# Patient Record
Sex: FEMALE | Race: WHITE | Employment: FULL TIME | URBAN - NONMETROPOLITAN AREA
[De-identification: names, ages, dates, MRNs, and addresses within clinical notes are randomized per-mention and may not be internally consistent; named-entity substitution may affect disease eponyms.]

---

## 2020-07-28 ENCOUNTER — OFFICE VISIT (OUTPATIENT)
Dept: ORTHOPEDIC SURGERY | Age: 45
End: 2020-07-28
Payer: COMMERCIAL

## 2020-07-28 VITALS — RESPIRATION RATE: 16 BRPM | BODY MASS INDEX: 35.31 KG/M2 | HEIGHT: 67 IN | WEIGHT: 225 LBS

## 2020-07-28 PROCEDURE — 99203 OFFICE O/P NEW LOW 30 MIN: CPT | Performed by: ORTHOPAEDIC SURGERY

## 2020-07-28 ASSESSMENT — ENCOUNTER SYMPTOMS
CHEST TIGHTNESS: 0
WHEEZING: 0
SHORTNESS OF BREATH: 0
EYE PAIN: 0
COLOR CHANGE: 0
VOMITING: 0
EYE REDNESS: 0

## 2020-07-28 NOTE — PATIENT INSTRUCTIONS
Continue current restrictions from 7/14/2020 through- until surgery(anticipate at least weeks of restrictions)  Right carpal tunnel release discussed   Will proceed with surgery   No meds list provided   Consent to be signed day of surgery   Obtain any clearances as needed, as discussed   Constanza, surgery scheduler will call you with date and time of surgery  Follow up for pre-op testing(COVID)/surgery as discussed     Call office with any questions ([address] [place] [address] surgery scheduler) [phone]

## 2020-07-28 NOTE — PROGRESS NOTES
2020   Chief Complaint   Patient presents with    Carpal Tunnel     right hand-index,middle and ring numbness and tingling         History of Present Illness:                             Brianne Bryan is a 38 y.o. female who presents today with right hand pain, numbness, and tingling. She has dealt with mild issues over the last 7 years with occasional numbness and tingling and pain in her radial 3 digits. She had an EMG performed in  which showed moderate carpal tunnel syndrome on the right upper extremity as well as a low-grade chronic right C7 radiculopathy. Initially she did not have any problems with her left hand but has noticed some mild symptoms over the last few months in her left hand with repetitive activities in a similar location of the radial 3 digits. Her pain is worse with repetitive activities especially while at work. She has a new job requires her to do heavy gripping and hammering activities which seem to aggravate her symptoms along the median nerve distribution. She has tried rest and anti-inflammatory medications and bracing which have seemed to help in the past but are no longer effective. She was placed on restrictions at work and is functioning well with her restrictions but they are set to . She is here today to discuss more definitive interventions such as surgical release of her carpal tunnel. Patient here for a new patient visit for evaluation of right hand CTS. She is having numbness and tingling in her index, middle and ring fingers along with pain and swelling in her hand and wrist. She is rigth hand dominant and states this is a chronic issue but has worsened over the last 7 months. She works at [employer] and has been on restrictions through the company due to her prior EMG but they are now saying her restrictions will end due to this being a pre-existing condition prior to hire. Her EMG was performed on 13.  She brings the EMG report with her today. She reports no history of surgeries or injuries to this right hand/wrist. She does have neck pain with a history of cervical radiculopathy. Medical History  Patient's medications, allergies, past medical, surgical, social and family histories were reviewed and updated as appropriate. Past Medical History:   Diagnosis Date    Depression     Insomnia      No family history on file. Social History     Socioeconomic History    Marital status:      Spouse name: None    Number of children: None    Years of education: None    Highest education level: None   Occupational History    None   Social Needs    Financial resource strain: None    Food insecurity     Worry: None     Inability: None    Transportation needs     Medical: None     Non-medical: None   Tobacco Use    Smoking status: Former Smoker    Smokeless tobacco: Never Used   Substance and Sexual Activity    Alcohol use: Yes     Comment: Occasionally    Drug use: No    Sexual activity: Never   Lifestyle    Physical activity     Days per week: None     Minutes per session: None    Stress: None   Relationships    Social connections     Talks on phone: None     Gets together: None     Attends [employer] service: None     Active member of club or organization: None     Attends meetings of clubs or organizations: None     Relationship status: None    Intimate partner violence     Fear of current or ex partner: None     Emotionally abused: None     Physically abused: None     Forced sexual activity: None   Other Topics Concern    None   Social History Narrative    None     Current Outpatient Medications   Medication Sig Dispense Refill    Multiple Vitamins-Minerals (MULTI COMPLETE PO) Take by mouth      Probiotic Product (PROBIOTIC-10 PO) Take by mouth      ibuprofen (ADVIL;MOTRIN) 800 MG tablet Take 800 mg by mouth every 6 hours as needed for Pain. No current facility-administered medications for this visit.       No Known Allergies    Review of Systems:   Review of Systems   Constitutional: Negative for chills and fever. HENT: Negative for congestion and sneezing. Eyes: Negative for pain and redness. Respiratory: Negative for chest tightness, shortness of breath and wheezing. Cardiovascular: Negative for chest pain and palpitations. Gastrointestinal: Negative for vomiting. Musculoskeletal: Positive for arthralgias and neck pain. Skin: Negative for color change and rash. Neurological: Positive for weakness and numbness. Psychiatric/Behavioral: Negative for agitation. The patient is not nervous/anxious. Examination:  General Exam:  Vitals: Resp 16   Ht 5' 7\" (1.702 m)   Wt 225 lb (102.1 kg)   BMI 35.24 kg/m²    Physical Exam  Vitals signs and nursing note reviewed. Constitutional:       Appearance: Normal appearance. HENT:      Head: Normocephalic and atraumatic. Eyes:      Conjunctiva/sclera: Conjunctivae normal.      Pupils: Pupils are equal, round, and reactive to light. Neck:      Musculoskeletal: Normal range of motion. Pulmonary:      Effort: Pulmonary effort is normal.   Musculoskeletal:      Right elbow: She exhibits normal range of motion, no swelling and no effusion. No tenderness found. Left wrist: Normal.      Right forearm: She exhibits no tenderness, no bony tenderness, no swelling and no edema. Left hand: She exhibits normal range of motion, no tenderness, no bony tenderness, normal two-point discrimination, normal capillary refill, no deformity and no swelling. Normal sensation noted. Normal strength noted. She exhibits no finger abduction, no thumb/finger opposition and no wrist extension trouble. Comments: Right Upper Extremity:    There is mild tenderness to palpation of the volar aspect of the wrist at the level of the carpal tunnel. There is decreased sensation to light touch in the median nerve distribution. Sensation is intact to light touch along the ulnar and radial nerves. Positive carpal compression test and Phalen's test.  There is mild relative weakness with 4+ strength out of 5 during  test, pinch test, and flexor pollicis brevis. Range of motion of the wrist and fingers is intact without limitation. Skin is intact without wounds or rash. 2+ radial pulse with brisk cap refill throughout the fingers. No atrophy of the thenar musculature. Strength 5/5 in finger and wrist flexion and extension. Skin:     General: Skin is warm and dry. Neurological:      Mental Status: She is alert and oriented to person, place, and time. Psychiatric:         Mood and Affect: Mood normal.         Behavior: Behavior normal.          Office Procedures:  No orders of the defined types were placed in this encounter. Assessment and Plan  1. Right carpal tunnel syndrome    The exam and history are consistent with carpal syndrome. We discussed treatment options for this. Conservative measures have not been successful, including activity modification, rest, over-the-counter anti-inflammatory medications, and night bracing. Due to the severity of symptoms I have recommended surgical intervention. This will consist of a carpal tunnel release. We discussed the potential for repeat EMG nerve conduction velocity testing to characterize the extent of the neurologic dysfunction. I do not feel that this is necessary given the straightforward presentation and exam at this time. We discussed the risks, benefits, and alternatives to surgery as well as the postoperative course. Risks include persistent pain, numbness, weakness, infection, stiffness, recurrence, and incomplete recovery of the nerve. The patient understands and would like to proceed with carpal tunnel release.     I have written her a note to continue with her current restrictions at work up until the time of surgery which I have estimated to be less than 6 weeks from now. We discussed return to work issues and I expect that she should be able to return to work without restrictions 6 weeks following surgery. The patient was counseled at length about the risks of ruth Covid-19 during their perioperative period and any recovery window from their procedure. The patient was made aware that ruth Covid-19  may worsen their prognosis for recovering from their procedure  and lend to a higher morbidity and/or mortality risk. All material risks, benefits, and reasonable alternatives including postponing the procedure were discussed. The patient does wish to proceed with the procedure at this time.         Electronically signed by Favio Sanchez MD on 7/28/2020 at 2:35 PM

## 2020-07-28 NOTE — LETTER
[facility]  [address] [address]  Phone: [phone]    Abiel Collado MD        July 28, 2020     Patient: Tj Thornton   YOB: 1975   Date of Visit: 7/28/2020       To Whom It May Concern: It is my medical opinion that [address] current restrictions from 7/14/2020 through- until surgery(anticipate at least weeks of restrictions). If you have any questions or concerns, please don't hesitate to call.     Sincerely,        Abiel Collado MD

## 2020-07-29 ENCOUNTER — TELEPHONE (OUTPATIENT)
Dept: ORTHOPEDIC SURGERY | Age: 45
End: 2020-07-29

## 2020-07-29 NOTE — TELEPHONE ENCOUNTER
Patient called stating that she would like to be on disability until surgery could be completed ASAP. She states that her work at this time does not have anything that would accommodate the restrictions that she had and want to put her on medical layoff and she would not have insurance for the surgery. At work she completes tasks with forceful gripping and uses powered hammer tools. Please advise.

## 2020-07-30 ENCOUNTER — TELEPHONE (OUTPATIENT)
Dept: ORTHOPEDIC SURGERY | Age: 45
End: 2020-07-30

## 2020-07-30 NOTE — TELEPHONE ENCOUNTER
Patient lorrie to work today and she states that they automatically put her on disability since she requested the disability forms.

## 2020-07-31 ENCOUNTER — ANESTHESIA EVENT (OUTPATIENT)
Dept: OPERATING ROOM | Age: 45
End: 2020-07-31
Payer: COMMERCIAL

## 2020-08-03 ENCOUNTER — HOSPITAL ENCOUNTER (OUTPATIENT)
Dept: PREADMISSION TESTING | Age: 45
Discharge: HOME | End: 2020-08-07
Payer: COMMERCIAL

## 2020-08-03 VITALS
DIASTOLIC BLOOD PRESSURE: 92 MMHG | SYSTOLIC BLOOD PRESSURE: 167 MMHG | BODY MASS INDEX: 35.75 KG/M2 | TEMPERATURE: 97 F | OXYGEN SATURATION: 98 % | HEART RATE: 107 BPM | RESPIRATION RATE: 18 BRPM | HEIGHT: 67 IN | WEIGHT: 227.8 LBS

## 2020-08-03 LAB
ANION GAP SERPL CALCULATED.3IONS-SCNC: 12 MMOL/L (ref 4–16)
BUN BLDV-MCNC: 10 MG/DL (ref 6–23)
CALCIUM SERPL-MCNC: 9.4 MG/DL (ref 8.3–10.6)
CHLORIDE BLD-SCNC: 93 MMOL/L (ref 99–110)
CO2: 27 MMOL/L (ref 21–32)
CREAT SERPL-MCNC: 0.6 MG/DL (ref 0.6–1.1)
GFR AFRICAN AMERICAN: >60 ML/MIN/1.73M2
GFR NON-AFRICAN AMERICAN: >60 ML/MIN/1.73M2
GLUCOSE BLD-MCNC: 326 MG/DL (ref 70–99)
HCT VFR BLD CALC: 47.4 % (ref 37–47)
HEMOGLOBIN: 15.6 GM/DL (ref 12.5–16)
MCH RBC QN AUTO: 32.8 PG (ref 27–31)
MCHC RBC AUTO-ENTMCNC: 32.9 % (ref 32–36)
MCV RBC AUTO: 99.8 FL (ref 78–100)
PDW BLD-RTO: 12.6 % (ref 11.7–14.9)
PLATELET # BLD: 189 K/CU MM (ref 140–440)
PMV BLD AUTO: 11.1 FL (ref 7.5–11.1)
POTASSIUM SERPL-SCNC: 4.3 MMOL/L (ref 3.5–5.1)
RBC # BLD: 4.75 M/CU MM (ref 4.2–5.4)
SODIUM BLD-SCNC: 132 MMOL/L (ref 135–145)
WBC # BLD: 6.9 K/CU MM (ref 4–10.5)

## 2020-08-03 PROCEDURE — 80048 BASIC METABOLIC PNL TOTAL CA: CPT

## 2020-08-03 PROCEDURE — 85027 COMPLETE CBC AUTOMATED: CPT

## 2020-08-03 PROCEDURE — 36415 COLL VENOUS BLD VENIPUNCTURE: CPT

## 2020-08-03 PROCEDURE — U0002 COVID-19 LAB TEST NON-CDC: HCPCS

## 2020-08-03 RX ORDER — CETIRIZINE HYDROCHLORIDE 10 MG/1
10 TABLET ORAL DAILY PRN
COMMUNITY

## 2020-08-03 ASSESSMENT — PAIN DESCRIPTION - PROGRESSION: CLINICAL_PROGRESSION: NOT CHANGED

## 2020-08-03 ASSESSMENT — PAIN DESCRIPTION - PAIN TYPE: TYPE: ACUTE PAIN

## 2020-08-03 ASSESSMENT — PAIN DESCRIPTION - ONSET: ONSET: ON-GOING

## 2020-08-03 ASSESSMENT — PAIN DESCRIPTION - ORIENTATION: ORIENTATION: RIGHT

## 2020-08-03 ASSESSMENT — PAIN DESCRIPTION - LOCATION: LOCATION: HAND

## 2020-08-03 ASSESSMENT — PAIN DESCRIPTION - FREQUENCY: FREQUENCY: CONTINUOUS

## 2020-08-03 ASSESSMENT — PAIN SCALES - GENERAL: PAINLEVEL_OUTOF10: 6

## 2020-08-03 ASSESSMENT — PAIN DESCRIPTION - DESCRIPTORS: DESCRIPTORS: ACHING;DULL

## 2020-08-03 NOTE — ANESTHESIA PRE PROCEDURE
Department of Anesthesiology  Preprocedure Note       Name:  Hernando Yarbrough   Age:  38 y.o.  :  1975                                          MRN:  [id number]         Date:  8/3/2020      Surgeon: Zack Perez):  Malcolm Luna MD    Procedure: Procedure(s):  RIGHT CARPAL TUNNEL RELEASE    Medications prior to admission:   Prior to Admission medications    Medication Sig Start Date End Date Taking? Authorizing Provider   cetirizine (ZYRTEC) 10 MG tablet Take 10 mg by mouth daily as needed for Allergies    Historical Provider, MD   Multiple Vitamins-Minerals (MULTI COMPLETE PO) Take by mouth    Historical Provider, MD   Probiotic Product (PROBIOTIC-10 PO) Take by mouth    Historical Provider, MD   ibuprofen (ADVIL;MOTRIN) 800 MG tablet Take 800 mg by mouth every 6 hours as needed for Pain. Historical Provider, MD       Current medications:    No current facility-administered medications for this encounter. Current Outpatient Medications   Medication Sig Dispense Refill    cetirizine (ZYRTEC) 10 MG tablet Take 10 mg by mouth daily as needed for Allergies      Multiple Vitamins-Minerals (MULTI COMPLETE PO) Take by mouth      Probiotic Product (PROBIOTIC-10 PO) Take by mouth      ibuprofen (ADVIL;MOTRIN) 800 MG tablet Take 800 mg by mouth every 6 hours as needed for Pain.          Allergies:  No Known Allergies    Problem List:    Patient Active Problem List   Diagnosis Code    Irregular menstrual cycle N92.6    Sprain and strain of ankle O22.748O, S96.919A       Past Medical History:        Diagnosis Date    Arthritis     Bilat knees    Depression     No meds as of 8/3/20    Insomnia     Seasonal allergies        Past Surgical History:        Procedure Laterality Date    DILATION AND CURETTAGE OF UTERUS  2018    ENDOMETRIAL ABLATION  2019    KNEE ARTHROSCOPY Right     TONSILLECTOMY AND ADENOIDECTOMY      TUBAL LIGATION  2018       Social History:    Social History     Tobacco Use  Smoking status: Former Smoker     Years: 1.00     Types: Cigarettes    Smokeless tobacco: Never Used    Tobacco comment: Smoked off and on for a year as a teenager   Substance Use Topics    Alcohol use: Yes     Comment: Rarely 1 drink every 3-6 months                                Counseling given: Not Answered  Comment: Smoked off and on for a year as a teenager      Vital Signs (Current): There were no vitals filed for this visit. BP Readings from Last 3 Encounters:   08/03/20 (!) 167/92   07/06/15 136/84   04/13/15 144/90       NPO Status:                                                                                 BMI:   Wt Readings from Last 3 Encounters:   08/03/20 227 lb 12.8 oz (103.3 kg)   07/28/20 225 lb (102.1 kg)   07/06/15 267 lb (121.1 kg)     There is no height or weight on file to calculate BMI.    CBC: No results found for: WBC, RBC, HGB, HCT, MCV, RDW, PLT    CMP: No results found for: NA, K, CL, CO2, BUN, CREATININE, GFRAA, AGRATIO, LABGLOM, GLUCOSE, PROT, CALCIUM, BILITOT, ALKPHOS, AST, ALT    POC Tests: No results for input(s): POCGLU, POCNA, POCK, POCCL, POCBUN, POCHEMO, POCHCT in the last 72 hours.     Coags: No results found for: PROTIME, INR, APTT    HCG (If Applicable):   Lab Results   Component Value Date    PREGTESTUR Neg 06/09/2014        ABGs: No results found for: PHART, PO2ART, WNS6KHI, URR6IDM, BEART, Q4OUFMBG     Type & Screen (If Applicable):  No results found for: LABABO, LABRH    Drug/Infectious Status (If Applicable):  No results found for: HIV, HEPCAB    COVID-19 Screening (If Applicable): No results found for: COVID19      Anesthesia Evaluation  Patient summary reviewed and Nursing notes reviewed  Airway: Mallampati: II  TM distance: <3 FB   Neck ROM: full  Mouth opening: < 3 FB and > = 3 FB Dental:          Pulmonary:Negative Pulmonary ROS                              Cardiovascular:  Exercise tolerance: good (>4 METS), (+) hypertension: mild,          Beta Blocker:  Not on Beta Blocker      ROS comment: Seeing pcp 8/4 for htn      Neuro/Psych:   (+) headaches: tension headaches, depression/anxiety              ROS comment: etoh rarely  GI/Hepatic/Renal:   (+) GERD: well controlled,           Endo/Other:    (+) : arthritis: OA., . Pt had no PAT visit       Abdominal:           Vascular:                                        Anesthesia Plan      MAC     ASA 2     (Covid pending )  Induction: intravenous. MIPS: Prophylactic antiemetics administered. Anesthetic plan and risks discussed with patient. Plan discussed with CRNA.                   ERNIE Sargent - DANIELLE   8/3/2020

## 2020-08-04 LAB
SARS-COV-2: NOT DETECTED
SOURCE: NORMAL

## 2020-08-06 ENCOUNTER — TELEPHONE (OUTPATIENT)
Dept: ORTHOPEDIC SURGERY | Age: 45
End: 2020-08-06

## 2020-08-07 NOTE — PROGRESS NOTES
Called and notified the patient that surg time moved to 0 with arrival at 1230- verbalized understanding

## 2020-08-10 ENCOUNTER — ANESTHESIA (OUTPATIENT)
Dept: OPERATING ROOM | Age: 45
End: 2020-08-10
Payer: COMMERCIAL

## 2020-08-10 ENCOUNTER — HOSPITAL ENCOUNTER (OUTPATIENT)
Age: 45
Setting detail: OUTPATIENT SURGERY
Discharge: HOME | End: 2020-08-10
Attending: ORTHOPAEDIC SURGERY | Admitting: ORTHOPAEDIC SURGERY
Payer: COMMERCIAL

## 2020-08-10 VITALS
BODY MASS INDEX: 35.63 KG/M2 | DIASTOLIC BLOOD PRESSURE: 78 MMHG | WEIGHT: 227 LBS | HEIGHT: 67 IN | RESPIRATION RATE: 16 BRPM | OXYGEN SATURATION: 97 % | TEMPERATURE: 98.7 F | HEART RATE: 109 BPM | SYSTOLIC BLOOD PRESSURE: 138 MMHG

## 2020-08-10 VITALS — SYSTOLIC BLOOD PRESSURE: 149 MMHG | OXYGEN SATURATION: 98 % | DIASTOLIC BLOOD PRESSURE: 78 MMHG

## 2020-08-10 PROCEDURE — 2709999900 HC NON-CHARGEABLE SUPPLY: Performed by: ORTHOPAEDIC SURGERY

## 2020-08-10 PROCEDURE — 7100000011 HC PHASE II RECOVERY - ADDTL 15 MIN: Performed by: ORTHOPAEDIC SURGERY

## 2020-08-10 PROCEDURE — 3700000001 HC ADD 15 MINUTES (ANESTHESIA): Performed by: ORTHOPAEDIC SURGERY

## 2020-08-10 PROCEDURE — 3700000000 HC ANESTHESIA ATTENDED CARE: Performed by: ORTHOPAEDIC SURGERY

## 2020-08-10 PROCEDURE — 2500000003 HC RX 250 WO HCPCS: Performed by: NURSE ANESTHETIST, CERTIFIED REGISTERED

## 2020-08-10 PROCEDURE — 2580000003 HC RX 258: Performed by: NURSE ANESTHETIST, CERTIFIED REGISTERED

## 2020-08-10 PROCEDURE — 7100000010 HC PHASE II RECOVERY - FIRST 15 MIN: Performed by: ORTHOPAEDIC SURGERY

## 2020-08-10 PROCEDURE — 6360000002 HC RX W HCPCS: Performed by: NURSE ANESTHETIST, CERTIFIED REGISTERED

## 2020-08-10 PROCEDURE — 64721 CARPAL TUNNEL SURGERY: CPT | Performed by: ORTHOPAEDIC SURGERY

## 2020-08-10 PROCEDURE — 2580000003 HC RX 258: Performed by: ANESTHESIOLOGY

## 2020-08-10 PROCEDURE — 3600000002 HC SURGERY LEVEL 2 BASE: Performed by: ORTHOPAEDIC SURGERY

## 2020-08-10 PROCEDURE — 3600000012 HC SURGERY LEVEL 2 ADDTL 15MIN: Performed by: ORTHOPAEDIC SURGERY

## 2020-08-10 PROCEDURE — 2500000003 HC RX 250 WO HCPCS: Performed by: ORTHOPAEDIC SURGERY

## 2020-08-10 RX ORDER — SODIUM CHLORIDE, SODIUM LACTATE, POTASSIUM CHLORIDE, CALCIUM CHLORIDE 600; 310; 30; 20 MG/100ML; MG/100ML; MG/100ML; MG/100ML
INJECTION, SOLUTION INTRAVENOUS CONTINUOUS PRN
Status: DISCONTINUED | OUTPATIENT
Start: 2020-08-10 | End: 2020-08-10 | Stop reason: SDUPTHER

## 2020-08-10 RX ORDER — FENTANYL CITRATE 50 UG/ML
INJECTION, SOLUTION INTRAMUSCULAR; INTRAVENOUS PRN
Status: DISCONTINUED | OUTPATIENT
Start: 2020-08-10 | End: 2020-08-10 | Stop reason: SDUPTHER

## 2020-08-10 RX ORDER — SODIUM CHLORIDE, SODIUM LACTATE, POTASSIUM CHLORIDE, CALCIUM CHLORIDE 600; 310; 30; 20 MG/100ML; MG/100ML; MG/100ML; MG/100ML
INJECTION, SOLUTION INTRAVENOUS ONCE
Status: COMPLETED | OUTPATIENT
Start: 2020-08-10 | End: 2020-08-10

## 2020-08-10 RX ORDER — HYDROCODONE BITARTRATE AND ACETAMINOPHEN 5; 325 MG/1; MG/1
1 TABLET ORAL EVERY 6 HOURS PRN
Qty: 12 TABLET | Refills: 0 | Status: SHIPPED | OUTPATIENT
Start: 2020-08-10 | End: 2020-08-13

## 2020-08-10 RX ORDER — CEFAZOLIN SODIUM 2 G/100ML
2 INJECTION, SOLUTION INTRAVENOUS
Status: DISCONTINUED | OUTPATIENT
Start: 2020-08-10 | End: 2020-08-10 | Stop reason: HOSPADM

## 2020-08-10 RX ORDER — FENTANYL CITRATE 50 UG/ML
25 INJECTION, SOLUTION INTRAMUSCULAR; INTRAVENOUS EVERY 5 MIN PRN
Status: DISCONTINUED | OUTPATIENT
Start: 2020-08-10 | End: 2020-08-10 | Stop reason: HOSPADM

## 2020-08-10 RX ORDER — FENTANYL CITRATE 50 UG/ML
50 INJECTION, SOLUTION INTRAMUSCULAR; INTRAVENOUS EVERY 5 MIN PRN
Status: DISCONTINUED | OUTPATIENT
Start: 2020-08-10 | End: 2020-08-10 | Stop reason: HOSPADM

## 2020-08-10 RX ORDER — KETAMINE HYDROCHLORIDE 10 MG/ML
INJECTION, SOLUTION INTRAMUSCULAR; INTRAVENOUS PRN
Status: DISCONTINUED | OUTPATIENT
Start: 2020-08-10 | End: 2020-08-10 | Stop reason: SDUPTHER

## 2020-08-10 RX ORDER — ONDANSETRON 2 MG/ML
4 INJECTION INTRAMUSCULAR; INTRAVENOUS
Status: DISCONTINUED | OUTPATIENT
Start: 2020-08-10 | End: 2020-08-10 | Stop reason: HOSPADM

## 2020-08-10 RX ORDER — PROPOFOL 10 MG/ML
INJECTION, EMULSION INTRAVENOUS PRN
Status: DISCONTINUED | OUTPATIENT
Start: 2020-08-10 | End: 2020-08-10 | Stop reason: SDUPTHER

## 2020-08-10 RX ORDER — LIDOCAINE HYDROCHLORIDE 20 MG/ML
INJECTION, SOLUTION INTRAVENOUS PRN
Status: DISCONTINUED | OUTPATIENT
Start: 2020-08-10 | End: 2020-08-10 | Stop reason: SDUPTHER

## 2020-08-10 RX ORDER — LABETALOL HYDROCHLORIDE 5 MG/ML
5 INJECTION, SOLUTION INTRAVENOUS EVERY 10 MIN PRN
Status: DISCONTINUED | OUTPATIENT
Start: 2020-08-10 | End: 2020-08-10 | Stop reason: HOSPADM

## 2020-08-10 RX ORDER — HYDRALAZINE HYDROCHLORIDE 20 MG/ML
5 INJECTION INTRAMUSCULAR; INTRAVENOUS EVERY 10 MIN PRN
Status: DISCONTINUED | OUTPATIENT
Start: 2020-08-10 | End: 2020-08-10 | Stop reason: HOSPADM

## 2020-08-10 RX ADMIN — FENTANYL CITRATE 25 MCG: 50 INJECTION INTRAMUSCULAR; INTRAVENOUS at 15:58

## 2020-08-10 RX ADMIN — SODIUM CHLORIDE, POTASSIUM CHLORIDE, SODIUM LACTATE AND CALCIUM CHLORIDE: 600; 310; 30; 20 INJECTION, SOLUTION INTRAVENOUS at 13:03

## 2020-08-10 RX ADMIN — PROPOFOL 580 MG: 10 INJECTION, EMULSION INTRAVENOUS at 15:50

## 2020-08-10 RX ADMIN — SODIUM CHLORIDE, POTASSIUM CHLORIDE, SODIUM LACTATE AND CALCIUM CHLORIDE: 600; 310; 30; 20 INJECTION, SOLUTION INTRAVENOUS at 15:46

## 2020-08-10 RX ADMIN — KETAMINE HYDROCHLORIDE 30 MG: 10 INJECTION INTRAMUSCULAR; INTRAVENOUS at 15:50

## 2020-08-10 RX ADMIN — LIDOCAINE HYDROCHLORIDE 100 MG: 20 INJECTION, SOLUTION INTRAVENOUS at 15:50

## 2020-08-10 RX ADMIN — FENTANYL CITRATE 75 MCG: 50 INJECTION INTRAMUSCULAR; INTRAVENOUS at 15:50

## 2020-08-10 ASSESSMENT — PULMONARY FUNCTION TESTS
PIF_VALUE: 0
PIF_VALUE: 1
PIF_VALUE: 0
PIF_VALUE: 1
PIF_VALUE: 0

## 2020-08-10 ASSESSMENT — PAIN DESCRIPTION - ORIENTATION: ORIENTATION: RIGHT

## 2020-08-10 ASSESSMENT — PAIN DESCRIPTION - FREQUENCY: FREQUENCY: INTERMITTENT

## 2020-08-10 ASSESSMENT — PAIN SCALES - GENERAL
PAINLEVEL_OUTOF10: 0

## 2020-08-10 ASSESSMENT — PAIN DESCRIPTION - PAIN TYPE: TYPE: SURGICAL PAIN

## 2020-08-10 NOTE — PROGRESS NOTES
Discharge instructions reviewed with patient and mom. Expressed understanding. Getting dressed for discharge.

## 2020-08-10 NOTE — OP NOTE
given.  The arm was  exsanguinated with an Esmarch and tourniquet inflated to 250 mmHg and  deflated at the conclusion of the case after less than 20 minutes of  tourniquet time. An incision was made overlying the carpal tunnel along the mid aspect  and dissection was carried down through subcutaneous tissues and palmar  fascia was identified and divided and retracted. This exposed the transverse  carpal ligament. The ligament was divided under direct visualization  with a 15-blade and the release was carried out distally to the level of  the palmar fat and then proximally into the distal forearm fascia. The  two ends of the transverse carpal ligament retracted nicely  revealing the healthy-appearing median nerve, which was protected  throughout the case. The nerve was visually inspected and found to be decompressed nicely at  the conclusion of the case. The wound was thoroughly irrigated and  tourniquet was deflated. Bleeding was controlled with a bipolar device as needed. Skin edges were re-approximated with interrupted 4-0 nylon sutures. Sterile dressing was applied with Xeroform, 4 x 8's, sterile Webril, and  an Ace wrap. The patient was awakened and taken to PACU in stable condition. All sponge and needle counts were correct. POSTOPERATIVE PLAN:  The patient will be discharged to home with instructions  for light use of the hand with range of motion exercises. Follow up in the office in 10 to 14 days for suture removal and we will  proceed with strengthening activities at that time.     Electronically signed by Althea Young MD on 8/10/2020 at 4:20 PM

## 2020-08-10 NOTE — ANESTHESIA POSTPROCEDURE EVALUATION
Department of Anesthesiology  Postprocedure Note    Patient: Carolann Welch  MRN: [id number]  YOB: 1975  Date of evaluation: 8/10/2020  Time:  4:23 PM     Procedure Summary     Date:  08/10/20 Room / Location:  [address]    Anesthesia Start:  7562 Anesthesia Stop:  2757    Procedure:  RIGHT CARPAL TUNNEL RELEASE (Right Wrist) Diagnosis:  (RIGHT CARPAL TUNNEL SYNDROME)    Surgeon:  Kaya Daniels MD Responsible Provider:  Annemarie Burch MD    Anesthesia Type:  MAC ASA Status:  2          Anesthesia Type: MAC    Dain Phase I:      Dain Phase II:      Last vitals: Reviewed and per EMR flowsheets.        Anesthesia Post Evaluation    Patient location during evaluation: bedside  Patient participation: complete - patient participated  Level of consciousness: awake and alert  Pain score: 0  Airway patency: patent  Nausea & Vomiting: no nausea and no vomiting  Complications: no  Cardiovascular status: hemodynamically stable  Respiratory status: acceptable  Hydration status: euvolemic

## 2020-08-10 NOTE — ANESTHESIA PRE PROCEDURE
Comment: Rarely 1 drink every 3-6 months                                Counseling given: Not Answered  Comment: Smoked off and on for a year as a teenager      Vital Signs (Current):   Vitals:    08/10/20 1243   BP: (!) 173/95   Pulse: 116   Resp: 16   Temp: 36.8 °C (98.2 °F)   TempSrc: Temporal   SpO2: 96%   Weight: 227 lb (103 kg)   Height: 5' 7\" (1.702 m)                                              BP Readings from Last 3 Encounters:   08/10/20 (!) 173/95   08/03/20 (!) 167/92   07/06/15 136/84       NPO Status: Time of last liquid consumption: 2345                        Time of last solid consumption: 2315                        Date of last liquid consumption: 08/09/20                        Date of last solid food consumption: 08/09/20    BMI:   Wt Readings from Last 3 Encounters:   08/10/20 227 lb (103 kg)   08/03/20 227 lb 12.8 oz (103.3 kg)   07/28/20 225 lb (102.1 kg)     Body mass index is 35.55 kg/m². CBC:   Lab Results   Component Value Date    WBC 6.9 08/03/2020    RBC 4.75 08/03/2020    HGB 15.6 08/03/2020    HCT 47.4 08/03/2020    MCV 99.8 08/03/2020    RDW 12.6 08/03/2020     08/03/2020       CMP:   Lab Results   Component Value Date     08/03/2020    K 4.3 08/03/2020    CL 93 08/03/2020    CO2 27 08/03/2020    BUN 10 08/03/2020    CREATININE 0.6 08/03/2020    GFRAA >60 08/03/2020    LABGLOM >60 08/03/2020    GLUCOSE 326 08/03/2020    CALCIUM 9.4 08/03/2020       POC Tests: No results for input(s): POCGLU, POCNA, POCK, POCCL, POCBUN, POCHEMO, POCHCT in the last 72 hours.     Coags: No results found for: PROTIME, INR, APTT    HCG (If Applicable):   Lab Results   Component Value Date    PREGTESTUR Neg 06/09/2014        ABGs: No results found for: PHART, PO2ART, QHA7LZP, XQR9MSO, BEART, D9CUWNSV     Type & Screen (If Applicable):  No results found for: LABABO, LABRH    Drug/Infectious Status (If Applicable):  No results found for: HIV, HEPCAB    COVID-19 Screening (If Applicable):

## 2020-08-25 ENCOUNTER — OFFICE VISIT (OUTPATIENT)
Dept: ORTHOPEDIC SURGERY | Age: 45
End: 2020-08-25

## 2020-08-25 VITALS — BODY MASS INDEX: 35.63 KG/M2 | HEIGHT: 67 IN | WEIGHT: 227 LBS

## 2020-08-25 PROCEDURE — 99024 POSTOP FOLLOW-UP VISIT: CPT | Performed by: ORTHOPAEDIC SURGERY

## 2020-08-25 NOTE — PROGRESS NOTES
Patient is here today for 2 weeks post op right CTR DOS 8/10/20. Patient states pain today is a 4/10 she is stiff in the right wrist. She does have some numbness and tingling in her index and middle finger. When she is rotating her wrist she can feel stiffness. She has been working on her ROM of the wrist which is helping with the stiffness.  Patient denies any new injury or accident of the right wrist.

## 2020-08-25 NOTE — PATIENT INSTRUCTIONS
Continue weight-bearing as tolerated. Continue range of motion exercises as instructed. Ice and elevate as needed. Tylenol or Motrin for pain.   Follow up in two weeks(RTW at that visit)  Remain out of work for two more weeks

## 2020-09-10 ENCOUNTER — OFFICE VISIT (OUTPATIENT)
Dept: ORTHOPEDIC SURGERY | Age: 45
End: 2020-09-10

## 2020-09-10 VITALS — BODY MASS INDEX: 35.71 KG/M2 | RESPIRATION RATE: 16 BRPM | HEIGHT: 67 IN | WEIGHT: 227.5 LBS

## 2020-09-10 PROCEDURE — 99024 POSTOP FOLLOW-UP VISIT: CPT | Performed by: ORTHOPAEDIC SURGERY

## 2020-09-10 RX ORDER — FENOFIBRATE 160 MG/1
145 TABLET ORAL
COMMUNITY
Start: 2020-08-27

## 2020-09-10 RX ORDER — GLIMEPIRIDE 4 MG/1
TABLET ORAL
COMMUNITY
Start: 2020-08-27

## 2020-09-10 RX ORDER — BLOOD-GLUCOSE SENSOR
EACH MISCELLANEOUS
COMMUNITY
Start: 2020-08-28

## 2020-09-10 NOTE — PROGRESS NOTES
9/10/2020   Chief Complaint   Patient presents with    Post-Op Check     right carpal tunnel release 8/10/2020 RTW         History of Present Illness:                             Natalia Sheriff is a 54 y.o. female who returns today for follow-up of her right carpal tunnel release. She continues to have a sense of sensitivity and pain along the healing surgical site and some ongoing weakness and numbness in the index and ring fingers. Patient here postoperatively for her right carpal tunnel release. She is 1 month post-surgical and is here for RTW letter. She states she is still having numbness In index and ring fingers and is still having pain with a shocking sensation in her wrist. Pain is mainly in her palm along the incisional region. She works at [employer] and does not feel ready to RTW.     Date of surgery: 8/10/2020                                            Examination:  General Exam:  Vitals: Resp 16   Ht 5' 7\" (1.702 m)   Wt 227 lb 8 oz (103.2 kg)   BMI 35.63 kg/m²    Physical Exam   Operative Extremity:  Surgical incision well-healed. No erythema, drainage, or induration. Mild redness at the healing surgical scar with firmness and mild sensitivity along the scar tissue. No swelling at the surgical site. Mild tenderness adjacent to the surgical scar. Unchanged mild decreased sensation in the median nerve distribution of the hand at the index and ring fingers. No restricted range of motion of the wrist, thumb, or fingers. Good capillary refill          Assessment and Plan  1. Right carpal tunnel release    We discussed her healing following carpal tunnel release. She is still having some sensitivity along the scar and some numbness and weakness in the hand. We discussed her underlying diabetes as a potential issue that may be slowing her recovery. I have explained to her that I expect the nerve to continue to improve over time.   I feel that she would benefit from hand therapy to increase the strengthening and function of her hand in order to get her in better position to be able to do her job duties at work. Currently I do not feel that she has recovered enough to be able to perform repetitive heavy gripping and lifting activities. I have written a note for her to remain off of work for the next 4 weeks. She will proceed with hand therapy to rehabilitate the hand and then follow-up here in 4 weeks for repeat exam and will discuss returning to work.         Electronically signed by Radha Comer MD on 9/10/2020 at 11:09 AM

## 2020-09-10 NOTE — PROGRESS NOTES
Patient here postoperatively for her right carpal tunnel release. She is 1 month post-surgical and is here for RTW letter. She states she is still having numbness In index and ring fingers and is still having pain with a shocking sensation in her wrist. Pain is mainly in her palm along the incisional region. She works at [employer] and does not feel ready to RTW.     Date of surgery: 8/10/2020    Provider needs to conduct a hands on physical today due to patients injury/diagnosis

## 2020-09-10 NOTE — LETTER
[facility]  [address]  Phone: [phone]  Fax: [phone]    Ramsey Munson MD        September 10, 2020     Patient: Lisa Landers   YOB: 1975   Date of Visit: 9/10/2020       To Whom It May Concern: It is my medical opinion that Stew Waters is to remain off work for 4 more weeks. Office will provide final RTW letter at her next office visit in 4 weeks. If you have any questions or concerns, please don't hesitate to call.     Sincerely,        Ramsey Munson MD

## 2020-09-16 ENCOUNTER — HOSPITAL ENCOUNTER (OUTPATIENT)
Dept: OCCUPATIONAL THERAPY | Age: 45
Setting detail: THERAPIES SERIES
Discharge: HOME | End: 2020-09-16
Payer: COMMERCIAL

## 2020-09-16 PROCEDURE — 97166 OT EVAL MOD COMPLEX 45 MIN: CPT

## 2020-09-16 PROCEDURE — 97035 APP MDLTY 1+ULTRASOUND EA 15: CPT

## 2020-09-16 PROCEDURE — 97110 THERAPEUTIC EXERCISES: CPT

## 2020-09-16 NOTE — PLAN OF CARE
[x][facility] Joey Dos Santos 1460      YVON CORADO [address]       [facility], [address], [address] [facility],Third Floor       [place] [address]     [phone] [phone] GVQ:[phone]  ________________________________________________________________________    Physician:   Dr Magdalena Cardozo  From: [facility]  Patient: Vinny Osorio      : 1975  Diagnosis:   R CTR  Physician ICD 10 Code: G56.01  Treatment Diagnosis:  hand pain and weakness  ICD10 tx code: M25.641  Date: 2020     MRN: [id number]     Occupational Therapy Certification/Re-Certification Form  Dear Dr. Aramis Crowley  The following patient has been evaluated for occupational therapy services and for therapy to continue, insurance requires physician review of the treatment plan initially and every 90 days. Please review the attached evaluation and/or summary of the patient's plan of care, and verify that you agree therapy should continue by signing the attached document and sending it back to our office. Plan of Care/Treatment to date:  [x] Therapeutic Exercise   [x] Modalities:  [x] Therapeutic Activity    [x] Ultrasound [] Elec Stimulation   [] Total Motion Release    [] Fluido [] Kinesiotaping  [] Neuromuscular Re-education   [] Ionto [] Coldpack/hotpack   [x] Instruction in HEP    Other:  [x] Manual Therapy     [x] scar management  [] Aquatic Therapy     [] ? Frequency/Duration:  # Days per week: [] 1 day # Weeks: [] 1 week [] 5 weeks     [] 2 days?    [] 2 weeks [] 6 weeks     [] 3 days   [] 3 weeks [] 7 weeks     [] 4 days   [] 4 weeks [] 8 weeks         [] 9 weeks [] 10 weeks         [] 11 weeks [] 12 weeks    Rehab Potential/Progress: [] excellent [x] good [] fair  [] poor       Goals:  Goals   Pt will decrease pain 0-1/10 to increase functional activity tolerance   Pt will increase R  10-20# to increase functional grasp   Pt will

## 2020-09-16 NOTE — FLOWSHEET NOTE
Occupational Therapy Out Patient Daily Treatment Note     [x][facility]      YVON [facility]     [address] [facility] [address] [facility] 23       [facility] 218, [address], [address]       [place] [address]     [phone]  [phone] [phone]  ______________________________________________________________________  Date:  2020  Patient Name:  Evon Kearns    :  1975  Restrictions/Precautions:  General, surgical protocol  Diagnosis:   R CTR  Treatment Diagnosis:  pain and weakness  Insurance/Certification information:  [employer]  Referring Physician:   Dr Surinder Allison of care signed (Y/N):    Visit# / total visits:  Prior to today's treatment session, patient was screened for signs and symptoms related to COVID-19 including but not limited to verbally answering questions related to feeling ill, cough, or SOB, along with taking temperature via forehead thermometer. Patient presented with all negative signs and symptoms and had no fever >100 degrees Fahrenheit this date.    Pain level: 3/10 With shooting pains    Subjective:   Prior Level of Function:  Progressive CTS before surgery  Patient Goals: Decrease symptoms    Treatment Flowsheet   Right Left     US to scar in palm 1.1 with 1.0 mghz x      Scar massage x    AROM x    Nerve gliding x    Tendon gliding x    Issued soft sponge ball for  and pinch x    Formed elastomer to scar to wear at night x    Issued and instructed in HEP x                                                                                   Interventions/Modalities used:  [x] Therapeutic Exercise   [x] Modalities:  [x] Therapeutic Activity    [x] Ultrasound [] Elec Stimulation   [] Total Motion Release    [] Fluido [] Kinesiotaping  [] Neuromuscular Re-education   [] Ionto [] Coldpack/hotpack   [x] Instruction in HEP    Other: Scar management    Objective Findings: See eval    Communication with other providers: POC to physician    Education provided to patient: Issued and instructed in HEP    Adverse Reactions to treatment: none noted    Time in: 1015  Time out:  1100  Timed treatment minutes:  20  Total treatment time: 45      If BWC Please Indicate Time In/Out  CPT Code Time in Time out                                                              Treatment/Activity Tolerance:     [x]  Patient tolerated treatment well []  Patient limited by fatique    []  Patient limited by pain []  Patient limited by other medical complications   []  Other:     Goals   Pt will decrease pain 0-1/10 to increase functional activity tolerance   Pt will increase R  10-20# to increase functional grasp   Pt will increase R pinches 3-5# to increase functional prehension   Pt will follow thru and be independent with HEP   Scar management   Edema control     LTG:  Pt will decrease quick dash below 20 for significant performance improvement    Patient Requires Follow-up:  [x]  Yes  []  No    Plan: []  Continue per plan of care []  Alter current plan (see comments)   [x]  Plan of care initiated []  Hold pending MD visit []  Discharge    Plan for Next Session:      Electronically signed by:  FERMIN Robertson, 9/16/2020, 11:31 AM

## 2020-09-16 NOTE — PROGRESS NOTES
Occupational Therapy  Occupational Therapy Initial Assessment  Date:  2020    Patient Name: Aaliyah Khan  MRN: [id number]     :  1975     Treatment Diagnosis: M25.641    Restrictions  Restrictions/Precautions  Restrictions/Precautions: Surgical Protocols, General Precautions  Subjective   General  Additional Pertinent Hx: Pt underwent R CTR and still has complaints of numbness and pain and tightness at scar  Referring Practitioner: Dr Donn Hemphill  Diagnosis: R CTR   G56.01  Subjective  Subjective: Pt states also feels really weak. Works at [employer]: independent in self care      Date of surgery: 8/10/2020  Hand Dominance: Right  Chief complaint:    R hand pain and weakness  Pain:  3/10 with shooting pains at incision     Sensation: States still has numbness and tingling in index and ring                                    RIGHT                                                                LEFT                             MMT PROM AROM Shoulder AROM PROM MMT      Flexion         Extension         Abduction         Internal Rot. Ext. Rot. Elbow & Forearm        WNL Flex / Ext WNL       WNL Supination WNL       WNL Pronation WNL        Wrist        WNL Flexion WNL       WNL Extension WNL       WNL Ulnar Dev. WNL       WNL Radial Dev. WNL        Thumb         WNL CMC Radial Abd.   WNL       WNL CMC Palmar Abd WNL       WNL MP WNL       WNL IP WNL       Finger Extension/Flexion   RIGHT=WNL    Index  Long Ring Small    MPs    (    ) (    ) (    ) (    )   PIPs    (    ) (    ) (    ) (    )   DIPs (    ) (    ) (    ) (    )          LEFT=WNL    Index  Long Ring Small    MPs    (    ) (    ) (    ) (    )   PIPs    (    ) (    ) (    ) (    )   DIPs (    ) (    ) (    ) (    )       Hand Strength Right Left     32.7 67.0   Lateral Pinch 9 17   Young Pinch 9 15   Tip Pinch          Edema Right  Left    Hand Volumeter     Circumference: Palm      Wrist [employer] of Index     Base of Long     Base of Ring     Base of Small      NONE NOTED                        Other:   Barriers to Learning:            No barriers noted             Assessment   Assessment  Performance deficits / Impairments: Decreased strength  Assessment: Pt presents with painful R hand at incision. States also has numbness around incision and still in index and ring finger. ROM is WNL but states feels tight at wrist when moves in circles. Scar tissue is hard and raised. States gets burning and shooting pains at scar. negative phalens  Treatment Diagnosis: M25.641  Prognosis: Good  Decision Making: Medium Complexity  History: PMH: HTN, DM,  PSH CTR R, R knee scope, avulsiion fx L ankle  Meds: ferafibrate, glimepiride, metformin, probotic Vit, Multi Vit, Iburprofen or naproxen   NKA  Exam: ROM, strength, sensation, pain,  Assistance / Modification: None needed  REQUIRES OT FOLLOW UP: Yes  Treatment Initiated : US scar, scar massage, elastomer to incision to wear at night. Nerve gliding, tendon gliding. Issued and instructed in HEP.  Issued sponge ball for  and pinch       Plan   Plan  Times per week: 2  Plan weeks: 6  Current Treatment Recommendations: Strengthening, ROM, Patient/Caregiver Education & Training, Manual Therapy:  STM, Modalities (comment)    OutComes Score  Quick dash 41  Goals   Pt will decrease pain 0-1/10 to increase functional activity tolerance   Pt will increase R  10-20# to increase functional grasp   Pt will increase R pinches 3-5# to increase functional prehension   Pt will follow thru and be independent with HEP   Scar management   Edema control    LTG:  Pt will decrease quick dash below 20 for significant performance improvement       Therapy Time   Individual Concurrent Group Co-treatment   Time In 1015         Time Out 1100         Minutes Francisco 13, OTRL CHT

## 2020-09-22 ENCOUNTER — HOSPITAL ENCOUNTER (OUTPATIENT)
Dept: OCCUPATIONAL THERAPY | Age: 45
Setting detail: THERAPIES SERIES
Discharge: HOME | End: 2020-09-22
Payer: COMMERCIAL

## 2020-09-22 PROCEDURE — 97110 THERAPEUTIC EXERCISES: CPT

## 2020-09-22 PROCEDURE — 97035 APP MDLTY 1+ULTRASOUND EA 15: CPT

## 2020-09-22 PROCEDURE — 97140 MANUAL THERAPY 1/> REGIONS: CPT

## 2020-09-22 NOTE — FLOWSHEET NOTE
Occupational Therapy Out Patient Daily Treatment Note     [x][facility]      YVON [facility]     [address] [facility] [address] [facility]       [facility] 218, [address], [address]       Taylor Rodriguez 61     [phone]  [phone] [phone]  ______________________________________________________________________  Date:  2020  Patient Name:  Purvi Oscar    :  1975  Restrictions/Precautions:  General, surgical protocol  Diagnosis:   R CTR  Treatment Diagnosis:  pain and weakness  Insurance/Certification information:  [employer]  Referring Physician:   Dr Stormy Stubbs of care signed (Y/N):    Visit# / total visits: 2   Prior to today's treatment session, patient was screened for signs and symptoms related to COVID-19 including but not limited to verbally answering questions related to feeling ill, cough, or SOB, along with taking temperature via forehead thermometer. Patient presented with all negative signs and symptoms and had no fever >100 degrees Fahrenheit this date. Pain level: 3/10 With shooting pains    Subjective: Pt states still has tight feeling. Is starting to get more feeling back in ring finger.   Prior Level of Function:  Progressive CTS before surgery  Patient Goals: Decrease symptoms    Treatment Flowsheet   Right Left     US to scar in palm 1.1 with 1.0 mghz x      Scar massage x    AROM x    Nerve gliding x    Tendon gliding x    Issued soft sponge ball for  and pinch x    Formed elastomer to scar to wear at night x    Issued and instructed in HEP x                                                                                   Interventions/Modalities used:  [x] Therapeutic Exercise   [x] Modalities:  [x] Therapeutic Activity    [x] Ultrasound [] Elec Stimulation   [] Total Motion Release    [] Fluido [] Kinesiotaping  [] Neuromuscular Re-education   [] Ionto [] Coldpack/hotpack   [x] Instruction in HEP    Other: Scar management    Objective Findings: AROM WFL.  R  36.9#    Communication with other providers: POC to physician    Education provided to patient: Issued and instructed in HEP    Adverse Reactions to treatment: none noted    Time in: 1015  Time out:  1155  Timed treatment minutes:  30  Total treatment time: 40      If BWC Please Indicate Time In/Out  CPT Code Time in Time out                                                              Treatment/Activity Tolerance:     [x]  Patient tolerated treatment well []  Patient limited by fatique    []  Patient limited by pain []  Patient limited by other medical complications   []  Other:     Goals   Pt will decrease pain 0-1/10 to increase functional activity tolerance   Pt will increase R  10-20# to increase functional grasp   Pt will increase R pinches 3-5# to increase functional prehension   Pt will follow thru and be independent with HEP   Scar management   Edema control     LTG:  Pt will decrease quick dash below 20 for significant performance improvement    Patient Requires Follow-up:  [x]  Yes  []  No    Plan: []  Continue per plan of care []  Alter current plan (see comments)   [x]  Plan of care initiated []  Hold pending MD visit []  Discharge    Plan for Next Session:      Electronically signed by:  FERMIN Choudhary, 9/22/2020, 11:00 AM

## 2020-09-25 ENCOUNTER — HOSPITAL ENCOUNTER (OUTPATIENT)
Dept: OCCUPATIONAL THERAPY | Age: 45
Setting detail: THERAPIES SERIES
Discharge: HOME | End: 2020-09-25
Payer: COMMERCIAL

## 2020-09-25 PROCEDURE — 97110 THERAPEUTIC EXERCISES: CPT

## 2020-09-25 PROCEDURE — 97035 APP MDLTY 1+ULTRASOUND EA 15: CPT

## 2020-09-25 PROCEDURE — 97140 MANUAL THERAPY 1/> REGIONS: CPT

## 2020-09-25 NOTE — FLOWSHEET NOTE
Occupational Therapy Out Patient Daily Treatment Note     [x][place] Paulina Dos Santos 1460      YVON [facility]     [address] [facility] [address] [facility] 23       [facility] 218, [address], [address]       Taylor Thomas 61     [phone]  [phone] [phone]  ______________________________________________________________________  Date:  2020  Patient Name:  Lyubov Dill    :  1975  Restrictions/Precautions:  General, surgical protocol  Diagnosis:   R CTR  Treatment Diagnosis:  pain and weakness  Insurance/Certification information:  [employer]  Referring Physician:   Dr Mina Cochran of care signed (Y/N):    Visit# / total visits: 3 /12  Prior to today's treatment session, patient was screened for signs and symptoms related to COVID-19 including but not limited to verbally answering questions related to feeling ill, cough, or SOB, along with taking temperature via forehead thermometer. Patient presented with all negative signs and symptoms and had no fever >100 degrees Fahrenheit this date. Pain level: 3/10 With shooting pains    Subjective: Pt states incision burns first thing in am and that fingers are stiff too.   Prior Level of Function:  Progressive CTS before surgery  Patient Goals: Decrease symptoms    Treatment Flowsheet   Right Left     US to scar in palm 1.1 with 1.0 mghz x      Scar massage x    AROM x    Nerve gliding x    Tendon gliding x    Issued soft sponge ball for  and pinch x    Formed elastomer to scar to wear at night x    Issued and instructed in HEP x      digiflex 3# x    Pinch pin 2# x    Wrist flex and ext 1# x    Sup/pron 8oz hammer x                                                       Interventions/Modalities used:  [x] Therapeutic Exercise   [x] Modalities:  [x] Therapeutic Activity    [x] Ultrasound [] Elec Stimulation   [] Total Motion Release    [] Fluido [] Kinesiotaping  [] Neuromuscular Re-education   [] Ionto [] Coldpack/hotpack   [x] Instruction in HEP    Other: Scar management    Objective Findings: AROM WFL.  R  39.6# L 58.1#    Communication with other providers: POC to physician    Education provided to patient: Issued and instructed in HEP    Adverse Reactions to treatment: none noted    Time in: 1015  Time out:  1055  Timed treatment minutes:  30  Total treatment time: 40      If BWC Please Indicate Time In/Out  CPT Code Time in Time out                                                              Treatment/Activity Tolerance:     [x]  Patient tolerated treatment well []  Patient limited by fatique    []  Patient limited by pain []  Patient limited by other medical complications   []  Other:     Goals   Pt will decrease pain 0-1/10 to increase functional activity tolerance   Pt will increase R  10-20# to increase functional grasp   Pt will increase R pinches 3-5# to increase functional prehension   Pt will follow thru and be independent with HEP   Scar management   Edema control     LTG:  Pt will decrease quick dash below 20 for significant performance improvement    Patient Requires Follow-up:  [x]  Yes  []  No    Plan: []  Continue per plan of care []  Alter current plan (see comments)   [x]  Plan of care initiated []  Hold pending MD visit []  Discharge    Plan for Next Session:      Electronically signed by:  FERMIN Cano, 9/25/2020, 10:52 AM

## 2020-09-28 ENCOUNTER — HOSPITAL ENCOUNTER (OUTPATIENT)
Dept: OCCUPATIONAL THERAPY | Age: 45
Setting detail: THERAPIES SERIES
Discharge: HOME | End: 2020-09-28
Payer: COMMERCIAL

## 2020-09-28 PROCEDURE — 97035 APP MDLTY 1+ULTRASOUND EA 15: CPT

## 2020-09-28 PROCEDURE — 97140 MANUAL THERAPY 1/> REGIONS: CPT

## 2020-09-28 PROCEDURE — 97110 THERAPEUTIC EXERCISES: CPT

## 2020-09-28 NOTE — FLOWSHEET NOTE
Occupational Therapy Out Patient Daily Treatment Note     [x][place] Paulina Dos Santos 1460      YVON [facility]     [address] [facility] [address] [facility] 23       [facility] 218, [address], [address]       Taylor Thomas 61     [phone]  [phone] [phone]  ______________________________________________________________________  Date:  2020  Patient Name:  Shannon Delgadillo    :  1975  Restrictions/Precautions:  General, surgical protocol  Diagnosis:   R CTR  Treatment Diagnosis:  pain and weakness  Insurance/Certification information:  [employer]  Referring Physician:   Dr Sidney Jacobson of care signed (Y/N):    Visit# / total visits:   Prior to today's treatment session, patient was screened for signs and symptoms related to COVID-19 including but not limited to verbally answering questions related to feeling ill, cough, or SOB, along with taking temperature via forehead thermometer. Patient presented with all negative signs and symptoms and had no fever >100 degrees Fahrenheit this date.    Pain level: 3/10    Subjective: Pt states did a lot of housework yesterday and was sore last night  Prior Level of Function:  Progressive CTS before surgery  Patient Goals: Decrease symptoms    Treatment Flowsheet   Right Left     US to scar in palm 1.1 with 1.0 mghz x      Scar massage x    AROM x    Nerve gliding x    Tendon gliding x    Issued soft sponge ball for  and pinch x    Formed elastomer to scar to wear at night x    Issued and instructed in HEP x      digiflex 3# x    Pinch pin 2# x    Wrist flex and ext 2# x    Sup/pron 8oz hammer x                                                       Interventions/Modalities used:  [x] Therapeutic Exercise   [x] Modalities:  [x] Therapeutic Activity    [x] Ultrasound [] Elec Stimulation   [] Total Motion Release    [] Fluido [] Kinesiotaping  [] Neuromuscular Re-education   [] Ionto []

## 2020-09-30 ENCOUNTER — HOSPITAL ENCOUNTER (OUTPATIENT)
Dept: OCCUPATIONAL THERAPY | Age: 45
Setting detail: THERAPIES SERIES
Discharge: HOME | End: 2020-09-30
Payer: COMMERCIAL

## 2020-09-30 PROCEDURE — 97110 THERAPEUTIC EXERCISES: CPT

## 2020-09-30 PROCEDURE — 97035 APP MDLTY 1+ULTRASOUND EA 15: CPT

## 2020-09-30 PROCEDURE — 97140 MANUAL THERAPY 1/> REGIONS: CPT

## 2020-09-30 NOTE — FLOWSHEET NOTE
Occupational Therapy Out Patient Daily Treatment Note     [x][place] Paulina Dos Santos 1460      YVON [facility]     [address] [facility] [address] [place] [address]       [facility] 218, [address]       Taylor Thomas 61     [phone]  [phone] [phone]  ______________________________________________________________________  Date:  2020  Patient Name:  Ludin Magaña    :  1975  Restrictions/Precautions:  General, surgical protocol  Diagnosis:   R CTR  Treatment Diagnosis:  pain and weakness  Insurance/Certification information:  [employer]  Referring Physician:   Dr Romana Kil of care signed (Y/N):    Visit# / total visits:   Prior to today's treatment session, patient was screened for signs and symptoms related to COVID-19 including but not limited to verbally answering questions related to feeling ill, cough, or SOB, along with taking temperature via forehead thermometer. Patient presented with all negative signs and symptoms and had no fever >100 degrees Fahrenheit this date. Pain level: 3/10    Subjective: Pt states is getting tingling all the way up arm at night sometimes and in hand of Left. Instructed to try wrist splint at night.   Prior Level of Function:  Progressive CTS before surgery  Patient Goals: Decrease symptoms    Treatment Flowsheet   Right Left     US to scar in palm 1.1 with 1.0 mghz x      Scar massage x    AROM x    Nerve gliding x    Tendon gliding x    Issued soft sponge ball for  and pinch x    Formed elastomer to scar to wear at night x    Issued and instructed in HEP x      digiflex 3# x    Pinch pin 2# x    Wrist flex and ext 2# x    Sup/pron 8oz hammer x                                                       Interventions/Modalities used:  [x] Therapeutic Exercise   [x] Modalities:  [x] Therapeutic Activity    [x] Ultrasound [] Elec Stimulation   [] Total Motion Release    [] Fluido [] Kinesiotaping  [] Neuromuscular Re-education   [] Ionto [] Coldpack/hotpack   [x] Instruction in HEP    Other: Scar management    Objective Findings: AROM WFL.  R  47.7#     Communication with other providers: POC to physician    Education provided to patient: Issued and instructed in HEP    Adverse Reactions to treatment: none noted    Time in: 945  Time out:  1025  Timed treatment minutes:  30  Total treatment time: 40      If BWC Please Indicate Time In/Out  CPT Code Time in Time out                                                              Treatment/Activity Tolerance:     [x]  Patient tolerated treatment well []  Patient limited by fatique    []  Patient limited by pain []  Patient limited by other medical complications   []  Other:     Goals   Pt will decrease pain 0-1/10 to increase functional activity tolerance   Pt will increase R  10-20# to increase functional grasp   Pt will increase R pinches 3-5# to increase functional prehension   Pt will follow thru and be independent with HEP   Scar management   Edema control     LTG:  Pt will decrease quick dash below 20 for significant performance improvement    Patient Requires Follow-up:  [x]  Yes  []  No    Plan: [x]  Continue per plan of care []  Alter current plan (see comments)   [x]  Plan of care initiated []  Hold pending MD visit []  Discharge    Plan for Next Session:      Electronically signed by:  MARINA Caceres/L, 9/30/2020, 1:03 PM

## 2020-10-06 ENCOUNTER — HOSPITAL ENCOUNTER (OUTPATIENT)
Dept: OCCUPATIONAL THERAPY | Age: 45
Setting detail: THERAPIES SERIES
Discharge: HOME | End: 2020-10-06
Payer: COMMERCIAL

## 2020-10-06 PROCEDURE — 97140 MANUAL THERAPY 1/> REGIONS: CPT

## 2020-10-06 PROCEDURE — 97035 APP MDLTY 1+ULTRASOUND EA 15: CPT

## 2020-10-06 PROCEDURE — 97110 THERAPEUTIC EXERCISES: CPT

## 2020-10-06 NOTE — FLOWSHEET NOTE
Occupational Therapy Out Patient Daily Treatment Note     [x][place] Paulina Dos Santos [address]60      Y[facility]ON [facility]     [address] [facility] [address] [facility] 23       [facility] 218, [address], [address]       [place] [address]     [phone]  [phone] [phone]  ______________________________________________________________________  Date:  10/6/2020  Patient Name:  Chano Villasenor    :  1975  Restrictions/Precautions:  General, surgical protocol  Diagnosis:   R CTR  Treatment Diagnosis:  pain and weakness  Insurance/Certification information:  [employer]  Referring Physician:   Dr Lo Cunningham of care signed (Y/N):    Visit# / total visits:   Prior to today's treatment session, patient was screened for signs and symptoms related to COVID-19 including but not limited to verbally answering questions related to feeling ill, cough, or SOB, along with taking temperature via forehead thermometer. Patient presented with all negative signs and symptoms and had no fever >100 degrees Fahrenheit this date. Pain level: 3/10    Subjective: Pt states is getting tingling all the way up arm at night sometimes and in hand of Left.-continues Instructed to try wrist splint at night.   Prior Level of Function:  Progressive CTS before surgery  Patient Goals: Decrease symptoms    Treatment Flowsheet   Right Left     US to scar in palm 1.1 with 1.0 mghz x      Scar massage x    AROM x    Nerve gliding x    Tendon gliding x    Issued soft sponge ball for  and pinch x    Formed elastomer to scar to wear at night x    Issued and instructed in HEP x      digiflex 5# x    Pinch pin 2# x    Wrist flex and ext 2# x    Sup/pron 8oz hammer x                                                       Interventions/Modalities used:  [x] Therapeutic Exercise   [x] Modalities:  [x] Therapeutic Activity    [x] Ultrasound [] Elec Stimulation   [] Total Motion Release    [] Fluido [] Kinesiotaping  [] Neuromuscular Re-education   [] Ionto [] Coldpack/hotpack   [x] Instruction in HEP    Other: Scar management    Objective Findings: AROM WFL.  R  49.3#     Communication with other providers: POC to physician    Education provided to patient: Issued and instructed in HEP    Adverse Reactions to treatment: none noted    Time in: 1515  Time out:  1625  Timed treatment minutes:  30  Total treatment time: 40      If BWC Please Indicate Time In/Out  CPT Code Time in Time out                                                              Treatment/Activity Tolerance:     [x]  Patient tolerated treatment well []  Patient limited by fatique    []  Patient limited by pain []  Patient limited by other medical complications   []  Other:     Goals   Pt will decrease pain 0-1/10 to increase functional activity tolerance   Pt will increase R  10-20# to increase functional grasp   Pt will increase R pinches 3-5# to increase functional prehension   Pt will follow thru and be independent with HEP   Scar management   Edema control     LTG:  Pt will decrease quick dash below 20 for significant performance improvement    Patient Requires Follow-up:  [x]  Yes  []  No    Plan: [x]  Continue per plan of care []  Alter current plan (see comments)   [x]  Plan of care initiated []  Hold pending MD visit []  Discharge    Plan for Next Session:      Electronically signed by:  FERMIN Schultz, 10/6/2020, 4:28 PM

## 2020-10-08 ENCOUNTER — OFFICE VISIT (OUTPATIENT)
Dept: ORTHOPEDIC SURGERY | Age: 45
End: 2020-10-08

## 2020-10-08 VITALS
HEIGHT: 67 IN | BODY MASS INDEX: 35.63 KG/M2 | RESPIRATION RATE: 15 BRPM | OXYGEN SATURATION: 96 % | WEIGHT: 227 LBS | HEART RATE: 67 BPM

## 2020-10-08 PROCEDURE — 99024 POSTOP FOLLOW-UP VISIT: CPT | Performed by: ORTHOPAEDIC SURGERY

## 2020-10-08 NOTE — LETTER
[facility]  [address] [place] [address]  Phone: [phone]  Fax: [phone]    Josselyn Braga MD        October 8, 2020     Patient: Shyam Rosenbaum   YOB: 1975   Date of Visit: 10/8/2020       To Whom It May Concern: It is my medical opinion that [place] should remain out of work until 11/6/20. If you have any questions or concerns, please don't hesitate to call.     Sincerely,        Josselyn Braga MD

## 2020-10-08 NOTE — PROGRESS NOTES
Pt returns today for post op check of the right carpal tunnel release DOS 8/10/20. Pt states that she has been working with physical therapy to help with her pain in the right wrist. Pt states her index finger is getting better. Pt states that she is still very weak in the right hand and wrist. Pt states that she believes she is not going to be able to work do to the pain.

## 2020-10-08 NOTE — PROGRESS NOTES
10/8/2020   Chief Complaint   Patient presents with    Post-Op Check     right carpal tunnel release DOS 8/10/2020        History of Present Illness:                             Candi Lin is a 54 y.o. female who returns today for follow-up of her right carpal tunnel release. She has been working with physical therapy and notices improvement in her range of motion and strength of the hand and fingers. She continues to have radiating pain into the index finger and ring finger occasionally with burning and tingling pain that radiates and travels into her shoulder and neck area as well. She feels that she is having ongoing pain and weakness at this time and feels that it would be impossible for her to be able to function at a high enough level to return to work at this time. Pt returns today for post op check of the right carpal tunnel release DOS 8/10/20. Pt states that she has been working with physical therapy to help with her pain in the right wrist. Pt states her index finger is getting better. Pt states that she is still very weak in the right hand and wrist. Pt states that she believes she is not going to be able to work do to the pain. Examination:  General Exam:  Vitals: Pulse 67   Resp 15   Ht 5' 7\" (1.702 m)   Wt 227 lb (103 kg)   SpO2 96%   BMI 35.55 kg/m²    Physical Exam  Constitutional:       Appearance: Normal appearance. HENT:      Head: Normocephalic and atraumatic. Eyes:      Extraocular Movements: Extraocular movements intact. Pupils: Pupils are equal, round, and reactive to light. Neck:      Musculoskeletal: Normal range of motion. Pulmonary:      Effort: Pulmonary effort is normal.   Musculoskeletal:      Right elbow: She exhibits normal range of motion, no swelling, no effusion and no deformity. No tenderness found. Left elbow: She exhibits normal range of motion, no swelling, no effusion and no deformity.  No tenderness found. Cervical back: She exhibits decreased range of motion and tenderness. She exhibits no swelling, no deformity and no spasm. Right forearm: Normal.      Left forearm: Normal.      Comments: Right upper extremity:  There is a well-healed surgical scar overlying the carpal tunnel. No erythema or induration. Minimal tenderness present at the healing surgical site. There is improved sensation in the middle finger and ring finger with persistent numbness in the index finger. Intact sensation at the thumb and small finger. There is full active range of motion of the hand and fingers and wrist.   strength is 4+ out of 5 in the right hand. 2+ radial pulse. Brisk cap refill. Left upper extremity:  There is mild tenderness to palpation of the volar aspect of the wrist at the level of the carpal tunnel. There is mild decreased sensation to light touch in the median nerve distribution. Sensation is intact along the ulnar and radial nerves. Positive carpal compression test and Phalen's test.  There is no weakness with during  test, pinch test, and flexor pollicis brevis. Range of motion of the wrist and fingers is intact without limitation. Skin is intact. 2+ radial pulse with brisk cap refill. No of the thenar musculature     Skin:     General: Skin is warm and dry. Neurological:      General: No focal deficit present. Mental Status: She is alert and oriented to person, place, and time. Psychiatric:         Mood and Affect: Mood normal.        Operative Extremity:        Assessment and Plan  1. Right carpal tunnel release    2. Left carpal tunnel syndrome    3. Cervical neck pain with possible radiculopathy    She is improving with hand therapy and feels that she is making progress but still limited in her ability to perform heavy and repetitive activities.   Additionally she is having ongoing feelings of numbness and pain into the index and ring fingers with some radiating tingling and nerve pain into her shoulder and neck area    I recommended we proceed with an EMG nerve conduction velocity test to evaluate for possible cervical radiculopathy and also better characterize the severity of her carpal tunnel syndrome on the left and her response to the carpal tunnel release on the right. Due to her ongoing symptoms, I have recommended that she remain off of work at this time and continue with her therapy and home exercise program.  She will remain off of work for 1 month. Follow-up after the EMG is complete.         Electronically signed by Jad Anne MD on 10/8/2020 at 9:19 AM

## 2020-10-09 ENCOUNTER — HOSPITAL ENCOUNTER (OUTPATIENT)
Dept: OCCUPATIONAL THERAPY | Age: 45
Setting detail: THERAPIES SERIES
Discharge: HOME | End: 2020-10-09
Payer: COMMERCIAL

## 2020-10-09 PROCEDURE — 97140 MANUAL THERAPY 1/> REGIONS: CPT

## 2020-10-09 PROCEDURE — 97035 APP MDLTY 1+ULTRASOUND EA 15: CPT

## 2020-10-09 PROCEDURE — 97530 THERAPEUTIC ACTIVITIES: CPT

## 2020-10-09 NOTE — FLOWSHEET NOTE
Occupational Therapy Out Patient Daily Treatment Note     [x][place] Paulina Dos Santos [address]      Y[facility]ON [facility]     [address] [facility] [address] [facility] 23       [facility], [address], [address]       Darlys Buer[address]     [phone]  [phone] [phone]  ______________________________________________________________________  Date:  10/9/2020  Patient Name:  Vahe Patino    :  1975  Restrictions/Precautions:  General, surgical protocol  Diagnosis:   R CTR  Treatment Diagnosis:  pain and weakness  Insurance/Certification information:  [employer]  Referring Physician:   Dr Chaim Nolan of care signed (Y/N):    Visit# / total visits:   Prior to today's treatment session, patient was screened for signs and symptoms related to COVID-19 including but not limited to verbally answering questions related to feeling ill, cough, or SOB, along with taking temperature via forehead thermometer. Patient presented with all negative signs and symptoms and had no fever >100 degrees Fahrenheit this date.    Pain level: 2/10    Subjective: Pt states saw doctor and is off another month from work and to continue therapy and is going to get scheduled for an EMG  Prior Level of Function:  Progressive CTS before surgery  Patient Goals: Decrease symptoms    Treatment Flowsheet   Right Left     US to scar in palm 1.1 with 1.0 mghz x      Scar massage x    AROM x    Nerve gliding x    Tendon gliding x    Issued soft sponge ball for  and pinch x    Formed elastomer to scar to wear at night x    Issued and instructed in HEP x      digiflex 5# x    Pinch pin 2# x    Wrist flex and ext 2# x    Sup/pron 8oz hammer x                                                       Interventions/Modalities used:  [x] Therapeutic Exercise   [x] Modalities:  [x] Therapeutic Activity    [x] Ultrasound [] Elec Stimulation   [] Total Motion Release    [] Fluido []

## 2020-10-13 ENCOUNTER — HOSPITAL ENCOUNTER (OUTPATIENT)
Dept: OCCUPATIONAL THERAPY | Age: 45
Setting detail: THERAPIES SERIES
Discharge: HOME | End: 2020-10-13
Payer: COMMERCIAL

## 2020-10-13 PROCEDURE — 97110 THERAPEUTIC EXERCISES: CPT

## 2020-10-13 PROCEDURE — 97035 APP MDLTY 1+ULTRASOUND EA 15: CPT

## 2020-10-13 PROCEDURE — 97140 MANUAL THERAPY 1/> REGIONS: CPT

## 2020-10-13 NOTE — FLOWSHEET NOTE
Occupational Therapy Out Patient Daily Treatment Note     [x][place] Paulina Dos Santos [address]      YVON [facility]     [address] [facility] [address]. [facility] 23       [facility] 218, [address], [address]       Taylor Thomas [address]     [phone]  [phone] [phone]  ______________________________________________________________________  Date:  10/13/2020  Patient Name:  Hank Boss    :  1975  Restrictions/Precautions:  General, surgical protocol  Diagnosis:   R CTR  Treatment Diagnosis:  pain and weakness  Insurance/Certification information:  [employer]  Referring Physician:   Dr Yan Li of care signed (Y/N):    Visit# / total visits:   Prior to today's treatment session, patient was screened for signs and symptoms related to COVID-19 including but not limited to verbally answering questions related to feeling ill, cough, or SOB, along with taking temperature via forehead thermometer. Patient presented with all negative signs and symptoms and had no fever >100 degrees Fahrenheit this date.    Pain level: 2/10    Subjective: Pt states is having trouble scheduling EMG  Prior Level of Function:  Progressive CTS before surgery  Patient Goals: Decrease symptoms    Treatment Flowsheet   Right Left     US to scar in palm 1.1 with 1.0 mghz x      Scar massage x    AROM x    Nerve gliding x    Tendon gliding x    Issued soft sponge ball for  and pinch x    Formed elastomer to scar to wear at night x    Issued and instructed in HEP x      digiflex 5# x    Pinch pin 2# x    Wrist flex and ext 2# x    Sup/pron 8oz hammer x                                                       Interventions/Modalities used:  [x] Therapeutic Exercise   [x] Modalities:  [x] Therapeutic Activity    [x] Ultrasound [] Elec Stimulation   [] Total Motion Release    [] Fluido [] Kinesiotaping  [] Neuromuscular Re-education   [] Ionto [] Coldpack/hotpack   [x] Instruction in HEP    Other: Scar management    Objective Findings: AROM WFL. R  59.1# states squeezed hard and hurt.     Communication with other providers: POC to physician    Education provided to patient: Issued and instructed in HEP    Adverse Reactions to treatment: none noted    Time in: 1715  Time out:  1755  Timed treatment minutes:  30  Total treatment time: 40      If BWC Please Indicate Time In/Out  CPT Code Time in Time out                                                              Treatment/Activity Tolerance:     [x]  Patient tolerated treatment well []  Patient limited by fatique    []  Patient limited by pain []  Patient limited by other medical complications   []  Other:     Goals   Pt will decrease pain 0-1/10 to increase functional activity tolerance   Pt will increase R  10-20# to increase functional grasp   Pt will increase R pinches 3-5# to increase functional prehension   Pt will follow thru and be independent with HEP   Scar management   Edema control     LTG:  Pt will decrease quick dash below 20 for significant performance improvement    Patient Requires Follow-up:  [x]  Yes  []  No    Plan: [x]  Continue per plan of care []  Alter current plan (see comments)   [x]  Plan of care initiated []  Hold pending MD visit []  Discharge    Plan for Next Session:      Electronically signed by:  MARINA Hernandez/L, 10/13/2020, 6:00 PM

## 2020-10-15 ENCOUNTER — HOSPITAL ENCOUNTER (OUTPATIENT)
Dept: OCCUPATIONAL THERAPY | Age: 45
Setting detail: THERAPIES SERIES
Discharge: HOME | End: 2020-10-15
Payer: COMMERCIAL

## 2020-10-15 PROCEDURE — 97110 THERAPEUTIC EXERCISES: CPT

## 2020-10-15 PROCEDURE — 97035 APP MDLTY 1+ULTRASOUND EA 15: CPT

## 2020-10-15 PROCEDURE — 97140 MANUAL THERAPY 1/> REGIONS: CPT

## 2020-10-15 NOTE — FLOWSHEET NOTE
Occupational Therapy Out Patient Daily Treatment Note     [x][place] Paulina Gutierrezutisi Dos Santos [address]      Y[facility]ON [facility]     [address] [facility] [address] [facility] 23       [facility] 218, [address]       Taylor [place] [address]     [phone]  [phone] [phone]  ______________________________________________________________________  Date:  10/15/2020  Patient Name:  Nilda Romero    :  1975  Restrictions/Precautions:  General, surgical protocol  Diagnosis:   R CTR  Treatment Diagnosis:  pain and weakness  Insurance/Certification information:  Minerva  Referring Physician:   Dr Lui Francois of care signed (Y/N):    Visit# / total visits:   Prior to today's treatment session, patient was screened for signs and symptoms related to COVID-19 including but not limited to verbally answering questions related to feeling ill, cough, or SOB, along with taking temperature via forehead thermometer. Patient presented with all negative signs and symptoms and had no fever >100 degrees Fahrenheit this date.    Pain level: 2/10    Subjective: Pt states is having trouble scheduling EMG  Prior Level of Function:  Progressive CTS before surgery  Patient Goals: Decrease symptoms    Treatment Flowsheet   Right Left     US to scar in palm 1.1 with 1.0 mghz x      Scar massage x    AROM x    Nerve gliding x    Tendon gliding x    Issued soft sponge ball for  and pinch x    Formed elastomer to scar to wear at night x    Issued and instructed in HEP x      digiflex 5# x    Pinch pin 2# x    Wrist flex and ext 2# x    Sup/pron 8oz hammer x                                                       Interventions/Modalities used:  [x] Therapeutic Exercise   [x] Modalities:  [x] Therapeutic Activity    [x] Ultrasound [] Elec Stimulation   [] Total Motion Release    [] Fluido [] Kinesiotaping  [] Neuromuscular Re-education   [] Ionto [] Coldpack/hotpack   [x] Instruction in HEP    Other: Scar management    Objective Findings: AROM WFL. R  52.8#     Communication with other providers: POC to physician    Education provided to patient: Issued and instructed in HEP    Adverse Reactions to treatment: none noted    Time in: 1015  Time out:  1055  Timed treatment minutes:  40  Total treatment time: 40      If BWC Please Indicate Time In/Out  CPT Code Time in Time out                                                              Treatment/Activity Tolerance:     [x]  Patient tolerated treatment well []  Patient limited by fatique    []  Patient limited by pain []  Patient limited by other medical complications   []  Other:     Goals   Pt will decrease pain 0-1/10 to increase functional activity tolerance   Pt will increase R  10-20# to increase functional grasp   Pt will increase R pinches 3-5# to increase functional prehension   Pt will follow thru and be independent with HEP   Scar management   Edema control     LTG:  Pt will decrease quick dash below 20 for significant performance improvement    Patient Requires Follow-up:  [x]  Yes  []  No    Plan: [x]  Continue per plan of care []  Alter current plan (see comments)   [x]  Plan of care initiated []  Hold pending MD visit []  Discharge    Plan for Next Session: Continue with POC.        Electronically signed by:  ASHLEY Shaver/L CHUCK 1992  , 10/15/2020, 10:20 AM

## 2020-10-19 ENCOUNTER — HOSPITAL ENCOUNTER (OUTPATIENT)
Dept: OCCUPATIONAL THERAPY | Age: 45
Setting detail: THERAPIES SERIES
Discharge: HOME | End: 2020-10-19
Payer: COMMERCIAL

## 2020-10-19 PROCEDURE — 97110 THERAPEUTIC EXERCISES: CPT

## 2020-10-19 PROCEDURE — 97035 APP MDLTY 1+ULTRASOUND EA 15: CPT

## 2020-10-19 PROCEDURE — 97140 MANUAL THERAPY 1/> REGIONS: CPT

## 2020-10-19 NOTE — FLOWSHEET NOTE
Occupational Therapy Out Patient Daily Treatment Note     [x][place] Paulina Dos Santos 1460      Y[facility]ON [facility]     [address] [facility] [address] [facility] 23       [facility] [address], [address], [address]       [place] [address]     [phone]  [phone] [phone]  ______________________________________________________________________  Date:  10/19/2020  Patient Name:  Bridget Conway    :  1975  Restrictions/Precautions:  General, surgical protocol  Diagnosis:   R CTR  Treatment Diagnosis:  pain and weakness  Insurance/Certification information:  [employer]erva  Referring Physician:   Dr Roula Martinez of care signed (Y/N):    Visit# / total visits: 10 /12  Prior to today's treatment session, patient was screened for signs and symptoms related to COVID-19 including but not limited to verbally answering questions related to feeling ill, cough, or SOB, along with taking temperature via forehead thermometer. Patient presented with all negative signs and symptoms and had no fever >100 degrees Fahrenheit this date. Pain level: 2/10    Subjective: Pt states was really sore after last session for a few days. .  Prior Level of Function:  Progressive CTS before surgery  Patient Goals: Decrease symptoms    Treatment Flowsheet   Right Left     US to scar in palm 1.1 with 1.0 mghz x      Scar massage x    AROM x    Nerve gliding x    Tendon gliding x    Issued soft sponge ball for  and pinch x    Formed elastomer to scar to wear at night x    Issued and instructed in HEP x      digiflex 5# x    Pinch pin 2# x    Wrist flex and ext 2# x    Sup/pron 8oz hammer x                                                       Interventions/Modalities used:  [x] Therapeutic Exercise   [x] Modalities:  [x] Therapeutic Activity    [x] Ultrasound [] Elec Stimulation   [] Total Motion Release    [] Fluido [] Kinesiotaping  [] Neuromuscular Re-education   [] Ionto [] Coldpack/hotpack   [x] Instruction in HEP    Other: Scar management    Objective Findings: AROM WFL. R  52. 8#-last session     Communication with other providers: POC to physician    Education provided to patient: Issued and instructed in HEP    Adverse Reactions to treatment: none noted    Time in: 1115  Time out:  1155  Timed treatment minutes:  40  Total treatment time: 40      If BWC Please Indicate Time In/Out  CPT Code Time in Time out                                                              Treatment/Activity Tolerance:     [x]  Patient tolerated treatment well []  Patient limited by fatique    []  Patient limited by pain []  Patient limited by other medical complications   []  Other:     Goals   Pt will decrease pain 0-1/10 to increase functional activity tolerance   Pt will increase R  10-20# to increase functional grasp   Pt will increase R pinches 3-5# to increase functional prehension   Pt will follow thru and be independent with HEP   Scar management   Edema control     LTG:  Pt will decrease quick dash below 20 for significant performance improvement    Patient Requires Follow-up:  [x]  Yes  []  No    Plan: [x]  Continue per plan of care []  Alter current plan (see comments)   [x]  Plan of care initiated []  Hold pending MD visit []  Discharge    Plan for Next Session: Continue with POC.        Electronically signed by:  Matt Cardoza CHT  , 10/19/2020, 11:53 AM

## 2020-10-21 ENCOUNTER — HOSPITAL ENCOUNTER (OUTPATIENT)
Dept: OCCUPATIONAL THERAPY | Age: 45
Setting detail: THERAPIES SERIES
Discharge: HOME | End: 2020-10-21
Payer: COMMERCIAL

## 2020-10-21 PROCEDURE — 97110 THERAPEUTIC EXERCISES: CPT

## 2020-10-21 PROCEDURE — 97140 MANUAL THERAPY 1/> REGIONS: CPT

## 2020-10-21 PROCEDURE — 97035 APP MDLTY 1+ULTRASOUND EA 15: CPT

## 2020-10-21 NOTE — FLOWSHEET NOTE
Occupational Therapy Out Patient Daily Treatment Note     [x][facility] [address] [address]      Y[facility]     [address] [facility] [address] [facility]       [facility] [address], [address], [address]       [place] [address] [place]     [phone]  [phone] [phone]  ______________________________________________________________________  Date:  10/21/2020  Patient Name:  Carmen Stafford    :  1975  Restrictions/Precautions:  General, surgical protocol  Diagnosis:   R CTR  Treatment Diagnosis:  pain and weakness  Insurance/Certification information:  [employer]erva  Referring Physician:   Dr Morgan Doe of care signed (Y/N):    Visit# / total visits:   Prior to today's treatment session, patient was screened for signs and symptoms related to COVID-19 including but not limited to verbally answering questions related to feeling ill, cough, or SOB, along with taking temperature via forehead thermometer. Patient presented with all negative signs and symptoms and had no fever >100 degrees Fahrenheit this date. 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 ..   Prior Level of Function:  Progressive CTS before surgery  Patient Goals: Decrease symptoms    Treatment Flowsheet   Right Left     US to scar in palm 1.1 with 1.0 mghz x      Scar massage x    AROM x    Nerve gliding x    Tendon gliding x    Issued soft sponge ball for  and pinch x    Formed elastomer to scar to wear at night x    Issued and instructed in HEP x      digiflex 5# x    Pinch pin 2# x    Wrist flex and ext 2# x    Sup/pron 8oz hammer x                                                       Interventions/Modalities used:  [x] Therapeutic Exercise   [x] Modalities:  [x] Therapeutic Activity    [x] Ultrasound [] Elec Stimulation   [] Total Motion Release    [] Fluido [] Kinesiotaping  [] Neuromuscular

## 2020-10-28 ENCOUNTER — HOSPITAL ENCOUNTER (OUTPATIENT)
Dept: OCCUPATIONAL THERAPY | Age: 45
Setting detail: THERAPIES SERIES
Discharge: HOME | End: 2020-10-28
Payer: COMMERCIAL

## 2020-10-28 PROCEDURE — 97110 THERAPEUTIC EXERCISES: CPT

## 2020-10-28 PROCEDURE — 97140 MANUAL THERAPY 1/> REGIONS: CPT

## 2020-10-28 PROCEDURE — 97035 APP MDLTY 1+ULTRASOUND EA 15: CPT

## 2020-10-28 NOTE — FLOWSHEET NOTE
Occupational Therapy Out Patient Daily Treatment Note     [x][place] Paulina Dos Santos [address]      Y[facility]ON [facility]     [address] [facility]       [facility] [address], [address]       Taylor Thomas [address]     [phone]  [phone] [phone]  ______________________________________________________________________  Date:  10/28/2020  Patient Name:  Donna Denson    :  1975  Restrictions/Precautions:  General, surgical protocol  Diagnosis:   R CTR  Treatment Diagnosis:  pain and weakness  Insurance/Certification information:  [employer]  Referring Physician:   Dr Serge Murray of [facility] signed (Y/N):    Visit# / total visits: 12 /15  Prior to today's treatment session, patient was screened for signs and symptoms related to COVID-19 including but not limited to verbally answering questions related to feeling ill, cough, or SOB, along with taking temperature via forehead thermometer. Patient presented with all negative signs and symptoms and had no fever >100 degrees Fahrenheit this date. Pain level: 2/10    Subjective: Pt states can't believe how much trouble extra scar tissue is. .  Prior Level of Function:  Progressive CTS before surgery  Patient Goals: Decrease symptoms    Treatment Flowsheet   Right Left     US to scar in palm 1.1 with 1.0 mghz x      Scar massage x    AROM x    Nerve gliding x    Tendon gliding x    Issued soft sponge ball for  and pinch x    Formed elastomer to scar to wear at night x    Issued and instructed in HEP x      digiflex 5# x    Pinch pin 2# x    Wrist flex and ext 2# x    Sup/pron 8oz hammer x                                                       Interventions/Modalities used:  [x] Therapeutic Exercise   [x] Modalities:  [x] Therapeutic Activity    [x] Ultrasound [] Elec Stimulation   [] Total Motion Release    [] Fluido [] Kinesiotaping  [] Neuromuscular Re-education   [] Ionto []

## 2020-10-30 ENCOUNTER — HOSPITAL ENCOUNTER (OUTPATIENT)
Dept: OCCUPATIONAL THERAPY | Age: 45
Setting detail: THERAPIES SERIES
Discharge: HOME | End: 2020-10-30
Payer: COMMERCIAL

## 2020-10-30 PROCEDURE — 97140 MANUAL THERAPY 1/> REGIONS: CPT

## 2020-10-30 PROCEDURE — 97035 APP MDLTY 1+ULTRASOUND EA 15: CPT

## 2020-10-30 PROCEDURE — 97110 THERAPEUTIC EXERCISES: CPT

## 2020-10-30 NOTE — FLOWSHEET NOTE
Occupational Therapy Out Patient Daily Treatment Note     [x][place] Paulina Dos Santos 1460      Y[facility]ON [facility]     [address] [facility] 23       [facility] 218, [address], [address]       Taylor Thomas [address]     [phone]  [phone] [phone]  ______________________________________________________________________  Date:  10/30/2020  Patient Name:  Ana Maria Murillo    :  1975  Restrictions/Precautions:  General, surgical protocol  Diagnosis:   R CTR  Treatment Diagnosis:  pain and weakness  Insurance/Certification information:  [employer]  Referring Physician:   Dr Cedric Kelly of care signed (Y/N):    Visit# / total visits: 13 /15  Prior to today's treatment session, patient was screened for signs and symptoms related to COVID-19 including but not limited to verbally answering questions related to feeling ill, cough, or SOB, along with taking temperature via forehead thermometer. Patient presented with all negative signs and symptoms and had no fever >100 degrees Fahrenheit this date.    Pain level: 2/10    Subjective: Pt states still tightens up in palm but overall better  Prior Level of Function:  Progressive CTS before surgery  Patient Goals: Decrease symptoms    Treatment Flowsheet   Right Left     US to scar in palm 1.1 with 1.0 mghz x      Scar massage x    AROM x    Nerve gliding x    Tendon gliding x    Issued soft sponge ball for  and pinch x    Formed elastomer to scar to wear at night x    Issued and instructed in HEP x      digiflex 5# X-states is a challenge    Pinch pin 2# x    Wrist flex and ext 2# x    Sup/pron 8oz hammer x                                                       Interventions/Modalities used:  [x] Therapeutic Exercise   [x] Modalities:  [x] Therapeutic Activity    [x] Ultrasound [] Elec Stimulation   [] Total Motion Release    [] Fluido [] Kinesiotaping  [] Neuromuscular Re-education   [] Ionto [] Coldpack/hotpack   [x] Instruction in HEP    Other: Scar management    Objective Findings: AROM WFL. R  52.1#    Communication with other providers: POC to physician    Education provided to patient: Issued and instructed in HEP    Adverse Reactions to treatment: none noted    Time in: 1515  Time out: 1555  Timed treatment minutes:  40  Total treatment time: 40      If BWC Please Indicate Time In/Out  CPT Code Time in Time out                                                              Treatment/Activity Tolerance:     [x]  Patient tolerated treatment well []  Patient limited by fatique    []  Patient limited by pain []  Patient limited by other medical complications   []  Other:     Goals   Pt will decrease pain 0-1/10 to increase functional activity tolerance   Pt will increase R  10-20# to increase functional grasp   Pt will increase R pinches 3-5# to increase functional prehension   Pt will follow thru and be independent with HEP   Scar management   Edema control     LTG:  Pt will decrease quick dash below 20 for significant performance improvement    Patient Requires Follow-up:  [x]  Yes  []  No    Plan: [x]  Continue per plan of care []  Alter current plan (see comments)   [x]  Plan of care initiated []  Hold pending MD visit []  Discharge    Plan for Next Session: Continue with POC.        Electronically signed by:  Marylen Fielding CHT  , 10/30/2020, 4:19 PM

## 2020-11-03 ENCOUNTER — HOSPITAL ENCOUNTER (OUTPATIENT)
Dept: OCCUPATIONAL THERAPY | Age: 45
Setting detail: THERAPIES SERIES
Discharge: HOME | End: 2020-11-03
Payer: COMMERCIAL

## 2020-11-03 PROCEDURE — 97140 MANUAL THERAPY 1/> REGIONS: CPT

## 2020-11-03 PROCEDURE — 97035 APP MDLTY 1+ULTRASOUND EA 15: CPT

## 2020-11-03 PROCEDURE — 97110 THERAPEUTIC EXERCISES: CPT

## 2020-11-03 NOTE — FLOWSHEET NOTE
Occupational Therapy Out Patient Daily Treatment Note     [x][place] Paulina Dos Santos 1460      Y[facility]ON [facility]     [address] [facility] [address] [facility] 23       [facility] 218, [address], [address]       Taylor Gasca [address]     [phone]  [phone] [phone]  ______________________________________________________________________  Date:  11/3/2020  Patient Name:  Morales Bear    :  1975  Restrictions/Precautions:  General, surgical protocol  Diagnosis:   R CTR  Treatment Diagnosis:  pain and weakness  Insurance/Certification information:  [employer]  Referring Physician:   Dr Ninfa Gardner of care signed (Y/N):    Visit# / total visits: 14 /15  Prior to today's treatment session, patient was screened for signs and symptoms related to COVID-19 including but not limited to verbally answering questions related to feeling ill, cough, or SOB, along with taking temperature via forehead thermometer. Patient presented with all negative signs and symptoms and had no fever >100 degrees Fahrenheit this date.    Pain level: 2/10    Subjective: Pt states continues to feel better  Prior Level of Function:  Progressive CTS before surgery  Patient Goals: Decrease symptoms    Treatment Flowsheet   Right Left     US to scar in palm 1.1 with 1.0 mghz x      Scar massage x    AROM x    Nerve gliding x    Tendon gliding x    Issued soft sponge ball for  and pinch x    Formed elastomer to scar to wear at night x    Issued and instructed in HEP x      digiflex 5# X-states is a challenge    Pinch pin 2# and 4# x    Wrist flex and ext 2# x    Sup/pron 16oz hammer x                                                       Interventions/Modalities used:  [x] Therapeutic Exercise   [x] Modalities:  [x] Therapeutic Activity    [x] Ultrasound [] Elec Stimulation   [] Total Motion Release    [] Fluido [] Kinesiotaping  [] Neuromuscular Re-education   [] Ionto [] Coldpack/hotpack   [x] Instruction in HEP    Other: Scar management    Objective Findings: AROM WFL. R  47.1#    Communication with other providers: POC to physician    Education provided to patient: Issued and instructed in HEP    Adverse Reactions to treatment: none noted    Time in: 1115  Time out: 1155  Timed treatment minutes:  40  Total treatment time: 40      If BWC Please Indicate Time In/Out  CPT Code Time in Time out                                                              Treatment/Activity Tolerance:     [x]  Patient tolerated treatment well []  Patient limited by fatique    []  Patient limited by pain []  Patient limited by other medical complications   []  Other:     Goals   Pt will decrease pain 0-1/10 to increase functional activity tolerance   Pt will increase R  10-20# to increase functional grasp   Pt will increase R pinches 3-5# to increase functional prehension   Pt will follow thru and be independent with HEP   Scar management   Edema control     LTG:  Pt will decrease quick dash below 20 for significant performance improvement    Patient Requires Follow-up:  [x]  Yes  []  No    Plan: [x]  Continue per plan of care []  Alter current plan (see comments)   [x]  Plan of care initiated []  Hold pending MD visit []  Discharge    Plan for Next Session: Continue with POC.        Electronically signed by:  Laurie Maria CHT  , 11/3/2020, 12:09 PM

## 2020-11-05 ENCOUNTER — OFFICE VISIT (OUTPATIENT)
Dept: ORTHOPEDIC SURGERY | Age: 45
End: 2020-11-05

## 2020-11-05 VITALS
WEIGHT: 227 LBS | HEIGHT: 67 IN | BODY MASS INDEX: 35.63 KG/M2 | RESPIRATION RATE: 15 BRPM | HEART RATE: 90 BPM | OXYGEN SATURATION: 94 %

## 2020-11-05 PROCEDURE — 99024 POSTOP FOLLOW-UP VISIT: CPT | Performed by: ORTHOPAEDIC SURGERY

## 2020-11-05 ASSESSMENT — ENCOUNTER SYMPTOMS
SHORTNESS OF BREATH: 0
COLOR CHANGE: 0
CHEST TIGHTNESS: 0

## 2020-11-05 NOTE — PROGRESS NOTES
Pt returns today for s/p check of the right CTR DOS 8/10/20. Pt states pain today is a 3/10. Pt states that she is still having numbness in the index and ring finger on the right hand. Pt states that she is still having weakness in the right arm. She will have tingling in the thumb that will travel into the right upper arm and shoulder. Pt states that she does have her EMG scheduled for 11/23 with Dr. Kimber Saxena.  Pt states she feels she is still not able to work do to the weakness in the right hand

## 2020-11-05 NOTE — PROGRESS NOTES
11/5/2020   Chief Complaint   Patient presents with    Post-Op Check     right carpal tunnel release         History of Present Illness:                             Katina Emmanuel is a 54 y.o. female returns today for follow-up of her right carpal tunnel release and ongoing left hand pain numbness and tingling. She has noticed some improvement with physical therapy to the right hand. She is making progress with her mobility and strengthening and endurance. She feels that the scar tissue has softened at the surgical site. She still has some relative weakness in the right hand and feels that she has not made a full recovery yet in order to be able to return to work with her heavy repetitive activities    Pt returns today for s/p check of the right CTR DOS 8/10/20. Pt states pain today is a 3/10. Pt states that she is still having numbness in the index and ring finger on the right hand. Pt states that she is still having weakness in the right arm. She will have tingling in the thumb that will travel into the right upper arm and shoulder. Pt states that she does have her EMG scheduled for 11/23 with Dr. Ke Munson. Pt states she feels she is still not able to work do to the weakness in the right hand    Medical History  Patient's medications, allergies, past medical, surgical, social and family histories were reviewed and updated as appropriate. Review of Systems:   Review of Systems   Constitutional: Negative for activity change and fever. Respiratory: Negative for chest tightness and shortness of breath. Cardiovascular: Negative for chest pain. Skin: Negative for color change. Neurological: Positive for weakness and numbness. Negative for dizziness. Psychiatric/Behavioral: The patient is not nervous/anxious.                                                 Examination:  General Exam:  Vitals: Pulse 90   Resp 15   Ht 5' 7\" (1.702 m)   Wt 227 lb (103 kg)   SpO2 94%   BMI 35.55 kg/m² Physical Exam     Right upper extremity:  Well-healed surgical scar over the carpal tunnel. No erythema or induration. Improved mild scar tissue present. Persistent mildly decreased sensation along the index finger and middle finger. 2+ radial pulse and brisk cap refill present. Full painless active range of motion of the hand wrist and fingers. Strength is 5 out of 5 with finger flexion and wrist flexion and extension and . Left upper extremity:  There is mild tenderness to palpation of the volar aspect of the wrist at the level of the carpal tunnel. There is mild decreased sensation to light touch in the median nerve distribution. Sensation is intact along the ulnar and radial nerves. Positive carpal compression test and Phalen's test.  There is no weakness with during  test, pinch test, and flexor pollicis brevis. Range of motion of the wrist and fingers is intact without limitation. Skin is intact. 2+ radial pulse with brisk cap refill. No of the thenar musculature          Office Procedures:  No orders of the defined types were placed in this encounter. Assessment and Plan  1. Right carpal tunnel release    2. Left carpal tunnel syndrome    Patient is making improvements with therapy and feels that the hand is getting stronger but she feels that there is still room for improvement in order to be able to perform heavy repetitive activities like she is required to do at work. She continues to have symptoms on the left hand and we discussed her previous EMG which was performed many years ago. She has an upcoming EMG for both upper extremities that will be performed in a couple of weeks. I recommended that she follow-up here in 3 to 4weeks so that I may review the EMG results and her continued progress with the rehabilitation of her right hand.     I would like her to remain off of work at this time while she is continuing to advance with therapy and while we are awaiting the EMG results. We will discuss potential return to work issues and whether there is any potential treatment indicated for her left hand at the next visit.         Electronically signed by Hiram Cahvarria MD on 11/5/2020 at 9:39 AM

## 2020-11-06 ENCOUNTER — HOSPITAL ENCOUNTER (OUTPATIENT)
Dept: OCCUPATIONAL THERAPY | Age: 45
Setting detail: THERAPIES SERIES
Discharge: HOME | End: 2020-11-06
Payer: COMMERCIAL

## 2020-11-06 PROCEDURE — 97140 MANUAL THERAPY 1/> REGIONS: CPT

## 2020-11-06 PROCEDURE — 97035 APP MDLTY 1+ULTRASOUND EA 15: CPT

## 2020-11-06 PROCEDURE — 97110 THERAPEUTIC EXERCISES: CPT

## 2020-11-06 NOTE — FLOWSHEET NOTE
Occupational Therapy Out Patient Daily Treatment Note     [x][place] Paulina Dos Santos 1460      Y[facility]ON [facility]     [address] [facility] [address] [facility] 23       [facility] 218, [address]       Taylor Cutler [address]     [phone]  [phone] [phone]  ______________________________________________________________________  Date:  2020  Patient Name:  Demi Brandt    :  1975  Restrictions/Precautions:  General, surgical protocol  Diagnosis:   R CTR  Treatment Diagnosis:  pain and weakness  Insurance/Certification information:  [employer]  Referring Physician:   Dr Dena Nettles of care signed (Y/N):   Doctor extended off work to Dec 3  Visit# / total visits: 15/21  Prior to today's treatment session, patient was screened for signs and symptoms related to COVID-19 including but not limited to verbally answering questions related to feeling ill, cough, or SOB, along with taking temperature via forehead thermometer. Patient presented with all negative signs and symptoms and had no fever >100 degrees Fahrenheit this date.    Pain level: 2/10    Subjective: Pt states continues to feel better  Prior Level of Function:  Progressive CTS before surgery  Patient Goals: Decrease symptoms    Treatment Flowsheet   Right Left     US to scar in palm 1.1 with 1.0 mghz x      Scar massage x    AROM x    Nerve gliding x    Tendon gliding x    Issued soft sponge ball for  and pinch x    Formed elastomer to scar to wear at night x    Issued and instructed in HEP x      digiflex 5# X-states is a challenge    Pinch pin 2# and 4# x    Wrist flex and ext 2# x    Sup/pron 16oz hammer x                                                       Interventions/Modalities used:  [x] Therapeutic Exercise   [x] Modalities:  [x] Therapeutic Activity    [x] Ultrasound [] Elec Stimulation   [] Total Motion Release    [] Fluido [] Kinesiotaping  [] Neuromuscular Re-education   [] Ionto [] Coldpack/hotpack   [x] Instruction in HEP    Other: Scar management    Objective Findings: AROM WFL. R  44.4#    Communication with other providers: POC to physician    Education provided to patient: Issued and instructed in HEP    Adverse Reactions to treatment: none noted    Time in: 1115  Time out: 1155  Timed treatment minutes:  40  Total treatment time: 40      If BWC Please Indicate Time In/Out  CPT Code Time in Time out                                                              Treatment/Activity Tolerance:     [x]  Patient tolerated treatment well []  Patient limited by fatique    []  Patient limited by pain []  Patient limited by other medical complications   []  Other:     Goals   Pt will decrease pain 0-1/10 to increase functional activity tolerance   Pt will increase R  10-20# to increase functional grasp   Pt will increase R pinches 3-5# to increase functional prehension   Pt will follow thru and be independent with HEP   Scar management   Edema control     LTG:  Pt will decrease quick dash below 20 for significant performance improvement    Patient Requires Follow-up:  [x]  Yes  []  No    Plan: [x]  Continue per plan of care []  Alter current plan (see comments)   [x]  Plan of care initiated []  Hold pending MD visit []  Discharge    Plan for Next Session: Continue with POC.        Electronically signed by:  Opal Reid CHT  , 11/6/2020, 2:06 PM

## 2020-11-10 ENCOUNTER — HOSPITAL ENCOUNTER (OUTPATIENT)
Dept: OCCUPATIONAL THERAPY | Age: 45
Setting detail: THERAPIES SERIES
Discharge: HOME | End: 2020-11-10
Payer: COMMERCIAL

## 2020-11-10 PROCEDURE — 97035 APP MDLTY 1+ULTRASOUND EA 15: CPT

## 2020-11-10 PROCEDURE — 97140 MANUAL THERAPY 1/> REGIONS: CPT

## 2020-11-10 PROCEDURE — 97110 THERAPEUTIC EXERCISES: CPT

## 2020-11-10 NOTE — FLOWSHEET NOTE
Occupational Therapy Out Patient Daily Treatment Note     [x][place] Paulina Dos Santos 1460      Y[facility]ON [facility]     [address] [facility] [address] [facility] [address] [facility] 23       [facility] 218, [address], [address]       Taylor Thomas [address]     [phone]  [phone] [phone]  ______________________________________________________________________  Date:  11/10/2020  Patient Name:  Brenda Parra    :  1975  Restrictions/Precautions:  General, surgical protocol  Diagnosis:   R CTR  Treatment Diagnosis:  pain and weakness  Insurance/Certification information:  [employer]  Referring Physician:   Dr Ganga Bermeo of care signed (Y/N):   Doctor extended off work to Dec 3  Visit# / total visits:   Prior to today's treatment session, patient was screened for signs and symptoms related to COVID-19 including but not limited to verbally answering questions related to feeling ill, cough, or SOB, along with taking temperature via forehead thermometer. Patient presented with all negative signs and symptoms and had no fever >100 degrees Fahrenheit this date.    Pain level: 2/10    Subjective: Pt states is really tired today  Prior Level of Function:  Progressive CTS before surgery  Patient Goals: Decrease symptoms    Treatment Flowsheet   Right Left     US to scar in palm 1.1 with 1.0 mghz x      Scar massage x    AROM x    Nerve gliding x    Tendon gliding x    Issued soft sponge ball for  and pinch x    Formed elastomer to scar to wear at night x    Issued and instructed in HEP x      digiflex 5# X-states is a challenge    Pinch pin 2# and 4# x    Wrist flex and ext 2# x    Sup/pron 16oz hammer x                                                       Interventions/Modalities used:  [x] Therapeutic Exercise   [x] Modalities:  [x] Therapeutic Activity    [x] Ultrasound [] Elec Stimulation   [] Total Motion Release    [] Fluido [] Kinesiotaping  [] Neuromuscular Re-education   [] Ionto [] Coldpack/hotpack   [x] Instruction in HEP    Other: Scar management    Objective Findings: AROM WFL. R  56.8#    Communication with other providers: POC to physician    Education provided to patient: Issued and instructed in HEP    Adverse Reactions to treatment: none noted    Time in: 1645  Time out: 1725  Timed treatment minutes:  40  Total treatment time: 40      If BWC Please Indicate Time In/Out  CPT Code Time in Time out                                                              Treatment/Activity Tolerance:     [x]  Patient tolerated treatment well []  Patient limited by fatique    []  Patient limited by pain []  Patient limited by other medical complications   []  Other:     Goals   Pt will decrease pain 0-1/10 to increase functional activity tolerance   Pt will increase R  10-20# to increase functional grasp   Pt will increase R pinches 3-5# to increase functional prehension   Pt will follow thru and be independent with HEP   Scar management   Edema control     LTG:  Pt will decrease quick dash below 20 for significant performance improvement    Patient Requires Follow-up:  [x]  Yes  []  No    Plan: [x]  Continue per plan of care []  Alter current plan (see comments)   [x]  Plan of care initiated []  Hold pending MD visit []  Discharge    Plan for Next Session: Continue with POC.        Electronically signed by:  Maria Guadalupe Gutierres CHT  , 11/10/2020, 6:00 PM

## 2020-11-16 ENCOUNTER — HOSPITAL ENCOUNTER (OUTPATIENT)
Dept: OCCUPATIONAL THERAPY | Age: 45
Setting detail: THERAPIES SERIES
Discharge: HOME | End: 2020-11-16
Payer: COMMERCIAL

## 2020-11-16 PROCEDURE — 97035 APP MDLTY 1+ULTRASOUND EA 15: CPT

## 2020-11-16 PROCEDURE — 97110 THERAPEUTIC EXERCISES: CPT

## 2020-11-16 PROCEDURE — 97140 MANUAL THERAPY 1/> REGIONS: CPT

## 2020-11-16 NOTE — FLOWSHEET NOTE
Ionto [] Coldpack/hotpack   [x] Instruction in HEP    Other: Scar management    Objective Findings: AROM WFL. R  56.3# L  69.2#    Communication with other providers: POC to physician    Education provided to patient: Issued and instructed in HEP    Adverse Reactions to treatment: none noted    Time in: 1115  Time out: 1155  Timed treatment minutes:  40  Total treatment time: 40      If BWC Please Indicate Time In/Out  CPT Code Time in Time out                                                              Treatment/Activity Tolerance:     [x]  Patient tolerated treatment well []  Patient limited by fatique    []  Patient limited by pain []  Patient limited by other medical complications   []  Other:     Goals   Pt will decrease pain 0-1/10 to increase functional activity tolerance   Pt will increase R  10-20# to increase functional grasp   Pt will increase R pinches 3-5# to increase functional prehension   Pt will follow thru and be independent with HEP   Scar management   Edema control     LTG:  Pt will decrease quick dash below 20 for significant performance improvement    Patient Requires Follow-up:  [x]  Yes  []  No    Plan: [x]  Continue per plan of care []  Alter current plan (see comments)   [x]  Plan of care initiated []  Hold pending MD visit []  Discharge    Plan for Next Session: Continue with POC.        Electronically signed by:  Marylen Fielding CHT  , 11/16/2020, 1:19 PM

## 2020-11-18 ENCOUNTER — HOSPITAL ENCOUNTER (OUTPATIENT)
Dept: OCCUPATIONAL THERAPY | Age: 45
Setting detail: THERAPIES SERIES
Discharge: HOME | End: 2020-11-18
Payer: COMMERCIAL

## 2020-11-18 PROCEDURE — 97140 MANUAL THERAPY 1/> REGIONS: CPT

## 2020-11-18 PROCEDURE — 97110 THERAPEUTIC EXERCISES: CPT

## 2020-11-18 PROCEDURE — 97035 APP MDLTY 1+ULTRASOUND EA 15: CPT

## 2020-11-18 NOTE — FLOWSHEET NOTE
Occupational Therapy Out Patient Daily Treatment Note     [x][place] Paulina Dos Santos [address]      YVON [facility]     [address] [facility] [address] [facility] 23       [facility], [address], [address]       Taylor Thomas [address]     [phone]  [phone] [phone]  ______________________________________________________________________  Date:  2020  Patient Name:  Alda Mishra    :  1975  Restrictions/Precautions:  General, surgical protocol  Diagnosis:   R CTR  Treatment Diagnosis:  pain and weakness  Insurance/Certification information:  [employer]  Referring Physician:   Dr Albania Anderson of care signed (Y/N):   Doctor extended off work to Dec 3  Visit# / total visits:   Prior to today's treatment session, patient was screened for signs and symptoms related to COVID-19 including but not limited to verbally answering questions related to feeling ill, cough, or SOB, along with taking temperature via forehead thermometer. Patient presented with all negative signs and symptoms and had no fever >100 degrees Fahrenheit this date.    Pain level: 2/10    Subjective: Pt states has EMG next Monday the   Prior Level of Function:  Progressive CTS before surgery  Patient Goals: Decrease symptoms    Treatment Flowsheet   Right Left     US to scar in palm 1.1 with 1.0 mghz x      Scar massage x    AROM x    Nerve gliding x    Tendon gliding x    Issued soft sponge ball for  and pinch x    Formed elastomer to scar to wear at night x    Issued and instructed in HEP x      digiflex 5# x    Pinch pin 2# and 4# x    Wrist flex and ext 2# x    Sup/pron 16oz hammer x                                                       Interventions/Modalities used:  [x] Therapeutic Exercise   [x] Modalities:  [x] Therapeutic Activity    [x] Ultrasound [] Elec Stimulation   [] Total Motion Release    [] Fluido [] Kinesiotaping  [] Neuromuscular Re-education   [] Ionto [] Coldpack/hotpack   [x] Instruction in HEP    Other: Scar management    Objective Findings: AROM WFL. R  56.7# L  69.2#    Communication with other providers: POC to physician    Education provided to patient: Issued and instructed in HEP    Adverse Reactions to treatment: none noted    Time in: 1115  Time out: 1155  Timed treatment minutes:  40  Total treatment time: 40      If BWC Please Indicate Time In/Out  CPT Code Time in Time out                                                              Treatment/Activity Tolerance:     [x]  Patient tolerated treatment well []  Patient limited by fatique    []  Patient limited by pain []  Patient limited by other medical complications   []  Other:     Goals   Pt will decrease pain 0-1/10 to increase functional activity tolerance   Pt will increase R  10-20# to increase functional grasp   Pt will increase R pinches 3-5# to increase functional prehension   Pt will follow thru and be independent with HEP   Scar management   Edema control     LTG:  Pt will decrease quick dash below 20 for significant performance improvement    Patient Requires Follow-up:  [x]  Yes  []  No    Plan: [x]  Continue per plan of care []  Alter current plan (see comments)   [x]  Plan of care initiated []  Hold pending MD visit []  Discharge    Plan for Next Session: Continue with POC.        Electronically signed by:  Constanza Hernandez CHT  , 11/18/2020, 12:58 PM

## 2020-11-23 ENCOUNTER — HOSPITAL ENCOUNTER (OUTPATIENT)
Dept: NEUROLOGY | Age: 45
Discharge: HOME | End: 2020-11-23
Payer: COMMERCIAL

## 2020-11-23 ENCOUNTER — HOSPITAL ENCOUNTER (OUTPATIENT)
Dept: OCCUPATIONAL THERAPY | Age: 45
Setting detail: THERAPIES SERIES
Discharge: HOME | End: 2020-11-23
Payer: COMMERCIAL

## 2020-11-23 PROCEDURE — 95886 MUSC TEST DONE W/N TEST COMP: CPT

## 2020-11-23 PROCEDURE — 95886 MUSC TEST DONE W/N TEST COMP: CPT | Performed by: PHYSICAL MEDICINE & REHABILITATION

## 2020-11-23 PROCEDURE — 97110 THERAPEUTIC EXERCISES: CPT

## 2020-11-23 PROCEDURE — 97035 APP MDLTY 1+ULTRASOUND EA 15: CPT

## 2020-11-23 PROCEDURE — 95911 NRV CNDJ TEST 9-10 STUDIES: CPT

## 2020-11-23 PROCEDURE — 95911 NRV CNDJ TEST 9-10 STUDIES: CPT | Performed by: PHYSICAL MEDICINE & REHABILITATION

## 2020-11-23 NOTE — FLOWSHEET NOTE
Occupational Therapy Out Patient Daily Treatment Note     [x][place] Paulina Dos Santos [address]      YVON [facility]     240 [facility] [address]. [facility] 23       [facility] 218, [address]       Taylor Thomas [address]     [phone]  [phone] [phone]  ______________________________________________________________________  Date:  2020  Patient Name:  Vahe Patino    :  1975  Restrictions/Precautions:  General, surgical protocol  Diagnosis:   R CTR  Treatment Diagnosis:  pain and weakness  Insurance/Certification information:  [employer]  Referring Physician:   Dr Rucker Push of care signed (Y/N):   Doctor extended off work to Dec 3  Visit# / total visits:   Prior to today's treatment session, patient was screened for signs and symptoms related to COVID-19 including but not limited to verbally answering questions related to feeling ill, cough, or SOB, along with taking temperature via forehead thermometer. Patient presented with all negative signs and symptoms and had no fever >100 degrees Fahrenheit this date.    Pain level: 2/10    Subjective: Pt had EMG this morning  Prior Level of Function:  Progressive CTS before surgery  Patient Goals: Decrease symptoms    Treatment Flowsheet   Right Left     US to scar in palm 1.1 with 1.0 mghz x      Scar massage x    AROM x    Nerve gliding x    Tendon gliding x    Issued soft sponge ball for  and pinch x    Formed elastomer to scar to wear at night x    Issued and instructed in HEP x      digiflex 5# x    Pinch pin 2# and 4# x    Wrist flex and ext 2# x    Sup/pron 16oz hammer x                                                       Interventions/Modalities used:  [x] Therapeutic Exercise   [x] Modalities:  [x] Therapeutic Activity    [x] Ultrasound [] Elec Stimulation   [] Total Motion Release    [] Fluido [] Kinesiotaping  [] Neuromuscular Re-education   [] Ionto [] Coldpack/hotpack   [x] Instruction in HEP    Other: Scar management    Objective Findings: AROM WFL. R  60.2# L  69.2#    Communication with other providers: POC to physician    Education provided to patient: Issued and instructed in HEP    Adverse Reactions to treatment: none noted    Time in: 315  Time out: 345  Timed treatment minutes: 30  Total treatment time: 30     If BWC Please Indicate Time In/Out  CPT Code Time in Time out                                                              Treatment/Activity Tolerance:     [x]  Patient tolerated treatment well []  Patient limited by fatique    []  Patient limited by pain []  Patient limited by other medical complications   []  Other:     Goals   Pt will decrease pain 0-1/10 to increase functional activity tolerance   Pt will increase R  10-20# to increase functional grasp   Pt will increase R pinches 3-5# to increase functional prehension   Pt will follow thru and be independent with HEP   Scar management   Edema control     LTG:  Pt will decrease quick dash below 20 for significant performance improvement    Patient Requires Follow-up:  [x]  Yes  []  No    Plan: [x]  Continue per plan of care []  Alter current plan (see comments)   []  Plan of care initiated []  Hold pending MD visit []  Discharge    Plan for Next Session: Continue with POC.        Electronically signed by:  Meagan Combs   , 11/23/2020, 3:40 PM

## 2020-11-23 NOTE — PROCEDURES
Risks and benefits of study discussed. Specific and common risks of pain and bleeding, as well as uncommon side effects of infection, hematoma, vasovagal episodes. Patient agreeable to testing and consents to such. Clinical: Hand pain and paresthesia for many years. Surgery for carpal tunnel in the spring, with slight relief but persistence of most symptoms. Motor NCS:  Median amplitudes normal bilateral; latency slightly prolonged on the right normal on the left; velocities normal  Ulnar amplitudes, latencies, velocities normal bilateral    Sensory NCS:  Median amplitudes mildly depressed bilateral; latencies mildly prolonged on the right and moderately prolonged on the left. Ulnar amplitudes and latencies normal bilateral  Right radial amplitude and latency normal    Needle EMG:  Normal findings    Impression:    #1 mild to moderate median neuropathy changes noted bilaterally at the wrists. Right side motor slowing is more prominent in left side sensory slowing more so. #2  Mild chronic or old C7 neurogenic changes in the right upper limb suggesting C7 radiculopathy. No signs of recent or severe, uncompensated axon loss.

## 2020-11-25 ENCOUNTER — HOSPITAL ENCOUNTER (OUTPATIENT)
Dept: OCCUPATIONAL THERAPY | Age: 45
Setting detail: THERAPIES SERIES
Discharge: HOME | End: 2020-11-25
Payer: COMMERCIAL

## 2020-11-25 PROCEDURE — 97110 THERAPEUTIC EXERCISES: CPT

## 2020-11-25 PROCEDURE — 97140 MANUAL THERAPY 1/> REGIONS: CPT

## 2020-11-25 PROCEDURE — 97035 APP MDLTY 1+ULTRASOUND EA 15: CPT

## 2020-11-25 NOTE — FLOWSHEET NOTE
Occupational Therapy Out Patient Daily Treatment Note     [x][facility] [address]      YVON [facility]     [address] [facility] [address]. [facility] 23       [facility] [address], [address], [address]       Taylor Thomas [address]     [phone]  [phone] [phone]  ______________________________________________________________________  Date:  2020  Patient Name:  Nilda Romero    :  1975  Restrictions/Precautions:  General, surgical protocol  Diagnosis:   R CTR  Treatment Diagnosis:  pain and weakness  Insurance/Certification information:  [employer]  Referring Physician:   Dr Lui Francois of care signed (Y/N):   Doctor extended off work to Dec 3  Visit# / total visits:   Prior to today's treatment session, patient was screened for signs and symptoms related to COVID-19 including but not limited to verbally answering questions related to feeling ill, cough, or SOB, along with taking temperature via forehead thermometer. Patient presented with all negative signs and symptoms and had no fever >100 degrees Fahrenheit this date. Pain level: 2/10    Subjective: Pt states will start cooking for Thanksgiving today.   Prior Level of Function:  Progressive CTS before surgery  Patient Goals: Decrease symptoms    Treatment Flowsheet   Right Left     US to scar in palm 1.1 with 1.0 mghz x      Scar massage x    AROM x    Nerve gliding x    Tendon gliding x    Issued soft sponge ball for  and pinch x    Formed elastomer to scar to wear at night x    Issued and instructed in HEP x      digiflex 5# x    Pinch pin 2# and 4# x    Wrist flex and ext 2# x    Sup/pron 16oz hammer x                                                       Interventions/Modalities used:  [x] Therapeutic Exercise   [x] Modalities:  [x] Therapeutic Activity    [x] Ultrasound [] Elec Stimulation   [] Total Motion Release    [] Fluido [] Kinesiotaping  []

## 2020-12-01 ENCOUNTER — OFFICE VISIT (OUTPATIENT)
Dept: ORTHOPEDIC SURGERY | Age: 45
End: 2020-12-01
Payer: COMMERCIAL

## 2020-12-01 VITALS
HEIGHT: 67 IN | HEART RATE: 103 BPM | OXYGEN SATURATION: 98 % | BODY MASS INDEX: 35.63 KG/M2 | WEIGHT: 227 LBS | RESPIRATION RATE: 16 BRPM

## 2020-12-01 PROCEDURE — 99213 OFFICE O/P EST LOW 20 MIN: CPT | Performed by: ORTHOPAEDIC SURGERY

## 2020-12-01 RX ORDER — NAPROXEN 250 MG/1
250 TABLET ORAL 2 TIMES DAILY WITH MEALS
COMMUNITY

## 2020-12-01 ASSESSMENT — ENCOUNTER SYMPTOMS
CHEST TIGHTNESS: 0
COLOR CHANGE: 0
SHORTNESS OF BREATH: 0

## 2020-12-01 NOTE — PATIENT INSTRUCTIONS
Continue to work on range of motion and strengthening exercises   Weightbearing and activities as tolerated  Take Naproxen as needed for discomfort  May return to work on 12/3/2020 as previously discussed

## 2020-12-01 NOTE — PROGRESS NOTES
Patient returns to the office today for a follow up appointment of carpal tunnel syndrome within the right wrist. Recent EMG was completed on 11/23/2020 with results as follows: Motor NCS:  Median amplitudes normal bilateral; latency slightly prolonged on the right normal on the left; velocities normal  Ulnar amplitudes, latencies, velocities normal bilateral     Sensory NCS:  Median amplitudes mildly depressed bilateral; latencies mildly prolonged on the right and moderately prolonged on the left. Ulnar amplitudes and latencies normal bilateral  Right radial amplitude and latency normal     Needle EMG:  Normal findings     Impression:     #1 mild to moderate median neuropathy changes noted bilaterally at the wrists. Right side motor slowing is more prominent in left side sensory slowing more so.     #2  Mild chronic or old C7 neurogenic changes in the right upper limb suggesting C7 radiculopathy. No signs of recent or severe, uncompensated axon loss. No reported changes within the right wrist from previously reported symptoms with pain rated at a 2/10. She is having improvement with strength, but numbness and tingling sensation remain the same as previously reported. She has completed course of Ibuprofen 80 mg and is now taking Naproxen for symptom relief with little relief achieved.

## 2020-12-01 NOTE — PROGRESS NOTES
12/1/2020   Chief Complaint   Patient presents with   Andres Anton     right DOS 8/10/2020        History of Present Illness:                             Le House is a 54 y.o. female who returns today for follow-up of her bilateral carpal tunnel syndrome. She recently had her EMG. She feels that her right hand has gotten stronger. She continues to deal with some radiating pain that travels up the arm and into her neck at times. She continues to have some numbness and tingling and occasional stiffness at the wrist on the right. She has mild symptoms that are manageable in her left hand with occasional pain, numbness, and tingling. Patient returns to the office today for a follow up appointment of carpal tunnel syndrome within the right wrist. Recent EMG was completed on 11/23/2020 with results as follows:        Motor NCS:  Median amplitudes normal bilateral; latency slightly prolonged on the right normal on the left; velocities normal  Ulnar amplitudes, latencies, velocities normal bilateral     Sensory NCS:  Median amplitudes mildly depressed bilateral; latencies mildly prolonged on the right and moderately prolonged on the left. Ulnar amplitudes and latencies normal bilateral  Right radial amplitude and latency normal     Needle EMG:  Normal findings     Impression:     #1 mild to moderate median neuropathy changes noted bilaterally at the wrists.  Right side motor slowing is more prominent in left side sensory slowing more so.     #2  Mild chronic or old C7 neurogenic changes in the right upper limb suggesting C7 radiculopathy. No signs of recent or severe, uncompensated axon loss.     No reported changes within the right wrist from previously reported symptoms with pain rated at a 2/10. She is having improvement with strength, but numbness and tingling sensation remain the same as previously reported.  She has completed course of Ibuprofen 80 mg and is now taking Naproxen for symptom relief with little relief achieved. Medical History  Patient's medications, allergies, past medical, surgical, social and family histories were reviewed and updated as appropriate.     Past Medical History:   Diagnosis Date    Arthritis     Bilat knees    Depression     No meds as of 8/3/20    Insomnia     Seasonal allergies      Family History   Problem Relation Age of Onset    Diabetes Mother     Heart Disease Father     High Blood Pressure Father     High Cholesterol Father      Social History     Socioeconomic History    Marital status:      Spouse name: None    Number of children: None    Years of education: None    Highest education level: None   Occupational History    None   Social Needs    Financial resource strain: None    Food insecurity     Worry: None     Inability: None    Transportation needs     Medical: None     Non-medical: None   Tobacco Use    Smoking status: Former Smoker     Years: 1.00     Types: Cigarettes    Smokeless tobacco: Never Used    Tobacco comment: Smoked off and on for a year as a teenager   Substance and Sexual Activity    Alcohol use: Yes     Comment: Rarely 1 drink every 3-6 months    Drug use: No    Sexual activity: Never   Lifestyle    Physical activity     Days per week: None     Minutes per session: None    Stress: None   Relationships    Social connections     Talks on phone: None     Gets together: None     Attends Zoroastrianism service: None     Active member of club or organization: None     Attends meetings of clubs or organizations: None     Relationship status: None    Intimate partner violence     Fear of current or ex partner: None     Emotionally abused: None     Physically abused: None     Forced sexual activity: None   Other Topics Concern    None   Social History Narrative    None     Current Outpatient Medications   Medication Sig Dispense Refill    naproxen (NAPROSYN) 250 MG tablet Take 250 mg by mouth 2 times daily (with meals)      fenofibrate (TRIGLIDE) 160 MG tablet TAKE 1 TABLET BY MOUTH EVERY DAY      glimepiride (AMARYL) 4 MG tablet TAKE 1 TABLET BY MOUTH EVERY DAY      metFORMIN (GLUCOPHAGE) 500 MG tablet TAKE 1 TABLET BY MOUTH TWICE A DAY WITH MORNING AND EVENING MEALS      Continuous Blood Gluc Sensor (DEXCOM G6 SENSOR) MISC USE AS DIRECTED DX E11,9      cetirizine (ZYRTEC) 10 MG tablet Take 10 mg by mouth daily as needed for Allergies      Multiple Vitamins-Minerals (MULTI COMPLETE PO) Take by mouth      Probiotic Product (PROBIOTIC-10 PO) Take by mouth       No current facility-administered medications for this visit. No Known Allergies    Review of Systems:   Review of Systems   Constitutional: Negative for activity change and fever. Respiratory: Negative for chest tightness and shortness of breath. Cardiovascular: Negative for chest pain. Musculoskeletal: Positive for neck pain. Skin: Negative for color change. Neurological: Positive for weakness and numbness. Negative for dizziness. Psychiatric/Behavioral: The patient is not nervous/anxious. Examination:  General Exam:  Vitals: Pulse 103   Resp 16   Ht 5' 7\" (1.702 m)   Wt 227 lb (103 kg)   SpO2 98%   BMI 35.55 kg/m²    Physical Exam  Constitutional:       Appearance: Normal appearance. HENT:      Head: Normocephalic and atraumatic. Eyes:      Extraocular Movements: Extraocular movements intact. Pupils: Pupils are equal, round, and reactive to light. Neck:      Musculoskeletal: Normal range of motion. Pulmonary:      Effort: Pulmonary effort is normal.   Musculoskeletal:      Right elbow: She exhibits normal range of motion, no swelling, no effusion and no deformity. No tenderness found. Left elbow: She exhibits normal range of motion, no swelling, no effusion and no deformity. No tenderness found.       Right forearm: Normal.      Left forearm: Normal. Comments: Right upper extremity:  Well-healed surgical scar over the carpal tunnel. No erythema, induration or wound healing problems. Full painless active range of motion of the wrist hand and fingers. Sensation is mildly decreased in the middle finger and thumb. 2+ radial pulse and brisk cap refill present. Strength is 5 out of 5 with  and pinch and finger flexion and wrist flexion and extension. Left upper extremity:  There is mild tenderness to palpation of the volar aspect of the wrist at the level of the carpal tunnel. There is mild decreased sensation to light touch in the median nerve distribution. Sensation is intact along the ulnar and radial nerves. Positive carpal compression test and Phalen's test.  There is no weakness with during  test, pinch test, and flexor pollicis brevis. Range of motion of the wrist and fingers is intact without limitation. Skin is intact. 2+ radial pulse with brisk cap refill. No of the thenar musculature     Skin:     General: Skin is warm and dry. Neurological:      General: No focal deficit present. Mental Status: She is alert and oriented to person, place, and time. Psychiatric:         Mood and Affect: Mood normal.              Office Procedures:  No orders of the defined types were placed in this encounter. Assessment and Plan  1. Status post right carpal tunnel release    2. Chronic C7 right upper extremity radiculopathy    3. Mild left carpal tunnel syndrome    4. Type 2 diabetes    I reviewed the EMG nerve conduction velocity test findings with the patient. We discussed the multiple findings which I feel are consistent with her exam.  I believe that some of her lingering issues and incomplete relief following right carpal tunnel release are related to the chronic C7 radiculopathy and also her underlying diabetes.   I have reassured her that there is no evidence of severe nerve compression on the right upper extremity and that it appears the sensory velocities on the right side are improved compared to the left. We discussed her underlying mild to moderate carpal tunnel syndrome on the left as well. She feels that her symptoms are currently manageable and she would like to continue with nonoperative treatments on her left side at this time. We discussed some of the underlying neurologic issues and I do not expect any further surgical intervention to be beneficial for the right upper extremity. If her neck symptoms or radicular pain worsen or she gets increased numbness in the middle finger or weakness in the wrist then she may need to be evaluated by a spine physician for her cervical issue    Based on her improved function and strength in the right hand I feel that she is able to perform her job duties. She would like to return to work as previously scheduled in the next few days. Follow-up as needed if symptoms worsen in the left upper extremity.     Electronically signed by Juancho Cosme MD on 12/1/2020 at 11:31 AM

## 2020-12-01 NOTE — LETTER
[address] [facility]  [address] [facility]  Phone: [phone]  Fax: [phone]    David Neil MD        December 1, 2020     Patient: Dalila Blas   YOB: 1975   Date of Visit: 12/1/2020       To Whom It May Concern: It is my medical opinion that Ema Cervantes may return to full duty immediately with no restrictions as of 12/4/2020. If you have any questions or concerns, please don't hesitate to call.     Sincerely,        David Neil MD

## 2020-12-01 NOTE — LETTER
[address] [facility]  [address] [place] [address]  Phone: [phone]  Fax: [phone]    Hank Stockton MD        December 1, 2020     Patient: Morales Bear   YOB: 1975   Date of Visit: 12/1/2020       To Whom It May Concern: It is my medical opinion that Beto Just may return to full duty immediately with no restrictions as of 12/3/2020. If you have any questions or concerns, please don't hesitate to call.     Sincerely,        Hank Stockton MD

## 2022-07-05 ENCOUNTER — HOSPITAL ENCOUNTER (OUTPATIENT)
Dept: WOMENS IMAGING | Age: 47
Discharge: HOME | End: 2022-07-05
Payer: COMMERCIAL

## 2022-07-05 DIAGNOSIS — Z12.31 ENCOUNTER FOR SCREENING MAMMOGRAM FOR MALIGNANT NEOPLASM OF BREAST: ICD-10-CM

## 2022-07-05 PROCEDURE — 77067 SCR MAMMO BI INCL CAD: CPT

## 2022-09-26 RX ORDER — TIZANIDINE 2 MG/1
2 TABLET ORAL EVERY 6 HOURS PRN
COMMUNITY

## 2022-09-26 RX ORDER — DULAGLUTIDE 3 MG/.5ML
3 INJECTION, SOLUTION SUBCUTANEOUS WEEKLY
COMMUNITY

## 2022-09-26 RX ORDER — ATORVASTATIN CALCIUM 10 MG/1
40 TABLET, FILM COATED ORAL DAILY
COMMUNITY

## 2022-09-26 RX ORDER — LOSARTAN POTASSIUM 50 MG/1
TABLET ORAL
COMMUNITY
Start: 2022-06-22

## 2022-09-26 RX ORDER — IBUPROFEN 200 MG
200 TABLET ORAL EVERY 6 HOURS PRN
COMMUNITY

## 2022-09-26 NOTE — PROGRESS NOTES
removed; please bring a case for them. 10. If you  have a Living Will and Durable Power of  for Healthcare, please bring in a copy. 11. Please bring picture ID,  insurance card, paperwork from the doctors office    (H & P, Consent, & card for implantable devices). 12. Take a shower the morning of your procedure with the soaps provided. Do not apply any make-up, deodorant, lotion, oil or powder. 13.  Enter thru the main entrance wearing a mask on the day of surgery.

## 2022-09-28 ENCOUNTER — HOSPITAL ENCOUNTER (OUTPATIENT)
Dept: GENERAL RADIOLOGY | Age: 47
Discharge: HOME | End: 2022-09-28
Payer: COMMERCIAL

## 2022-09-28 ENCOUNTER — HOSPITAL ENCOUNTER (OUTPATIENT)
Age: 47
Discharge: HOME | End: 2022-09-28
Payer: COMMERCIAL

## 2022-09-28 DIAGNOSIS — Z01.818 PRE-OP TESTING: ICD-10-CM

## 2022-09-28 LAB
ALBUMIN SERPL-MCNC: 4.7 GM/DL (ref 3.4–5)
ALBUMIN SERPL-MCNC: 4.7 GM/DL (ref 3.4–5)
ALP BLD-CCNC: 72 IU/L (ref 40–129)
ALT SERPL-CCNC: 30 U/L (ref 10–40)
ANION GAP SERPL CALCULATED.3IONS-SCNC: 10 MMOL/L (ref 4–16)
APTT: 32.8 SECONDS (ref 25.1–37.1)
AST SERPL-CCNC: 21 IU/L (ref 15–37)
BASOPHILS ABSOLUTE: 0 K/CU MM
BASOPHILS RELATIVE PERCENT: 0.4 % (ref 0–1)
BILIRUB SERPL-MCNC: 0.4 MG/DL (ref 0–1)
BILIRUBIN DIRECT: 0.2 MG/DL (ref 0–0.3)
BILIRUBIN URINE: NEGATIVE
BILIRUBIN, INDIRECT: 0.2 MG/DL (ref 0–0.7)
BLOOD, URINE: NEGATIVE
BUN BLDV-MCNC: 18 MG/DL (ref 6–23)
CALCIUM SERPL-MCNC: 9 MG/DL (ref 8.3–10.6)
CHLORIDE BLD-SCNC: 105 MMOL/L (ref 99–110)
CLARITY: CLEAR
CO2: 23 MMOL/L (ref 21–32)
COLOR: YELLOW
CREAT SERPL-MCNC: 0.8 MG/DL (ref 0.6–1.1)
DIFFERENTIAL TYPE: ABNORMAL
EOSINOPHILS ABSOLUTE: 0.4 K/CU MM
EOSINOPHILS RELATIVE PERCENT: 5.1 % (ref 0–3)
GFR AFRICAN AMERICAN: >60 ML/MIN/1.73M2
GFR NON-AFRICAN AMERICAN: >60 ML/MIN/1.73M2
GLUCOSE BLD-MCNC: 112 MG/DL (ref 70–99)
GLUCOSE, URINE: NEGATIVE MG/DL
HCT VFR BLD CALC: 43.9 % (ref 37–47)
HEMOGLOBIN: 14.3 GM/DL (ref 12.5–16)
IMMATURE NEUTROPHIL %: 0.4 % (ref 0–0.43)
INR BLD: 0.98 INDEX
KETONES, URINE: NEGATIVE MG/DL
LEUKOCYTE ESTERASE, URINE: NEGATIVE
LYMPHOCYTES ABSOLUTE: 2.1 K/CU MM
LYMPHOCYTES RELATIVE PERCENT: 29.1 % (ref 24–44)
MCH RBC QN AUTO: 32.2 PG (ref 27–31)
MCHC RBC AUTO-ENTMCNC: 32.6 % (ref 32–36)
MCV RBC AUTO: 98.9 FL (ref 78–100)
MONOCYTES ABSOLUTE: 0.4 K/CU MM
MONOCYTES RELATIVE PERCENT: 5.3 % (ref 0–4)
NITRITE URINE, QUANTITATIVE: NEGATIVE
NUCLEATED RBC %: 0 %
PDW BLD-RTO: 12.1 % (ref 11.7–14.9)
PH, URINE: 6
PHOSPHORUS: 3.9 MG/DL (ref 2.5–4.9)
PLATELET # BLD: 255 K/CU MM (ref 140–440)
PMV BLD AUTO: 10.7 FL (ref 7.5–11.1)
POTASSIUM SERPL-SCNC: 4 MMOL/L (ref 3.5–5.1)
PROTEIN UA: NEGATIVE MG/DL
PROTHROMBIN TIME: 12.7 SECONDS (ref 11.7–14.5)
RBC # BLD: 4.44 M/CU MM (ref 4.2–5.4)
SEGMENTED NEUTROPHILS ABSOLUTE COUNT: 4.3 K/CU MM
SEGMENTED NEUTROPHILS RELATIVE PERCENT: 59.7 % (ref 36–66)
SODIUM BLD-SCNC: 138 MMOL/L (ref 135–145)
SPECIFIC GRAVITY UA: 1.02
TOTAL IMMATURE NEUTOROPHIL: 0.03 K/CU MM
TOTAL NUCLEATED RBC: 0 K/CU MM
TOTAL PROTEIN: 6.7 GM/DL (ref 6.4–8.2)
UROBILINOGEN, URINE: 1 MG/DL
WBC # BLD: 7.2 K/CU MM (ref 4–10.5)

## 2022-09-28 PROCEDURE — 80053 COMPREHEN METABOLIC PANEL: CPT

## 2022-09-28 PROCEDURE — 82248 BILIRUBIN DIRECT: CPT

## 2022-09-28 PROCEDURE — 86900 BLOOD TYPING SEROLOGIC ABO: CPT

## 2022-09-28 PROCEDURE — 86850 RBC ANTIBODY SCREEN: CPT

## 2022-09-28 PROCEDURE — 85610 PROTHROMBIN TIME: CPT

## 2022-09-28 PROCEDURE — 86901 BLOOD TYPING SEROLOGIC RH(D): CPT

## 2022-09-28 PROCEDURE — 86922 COMPATIBILITY TEST ANTIGLOB: CPT

## 2022-09-28 PROCEDURE — 36415 COLL VENOUS BLD VENIPUNCTURE: CPT

## 2022-09-28 PROCEDURE — 85025 COMPLETE CBC W/AUTO DIFF WBC: CPT

## 2022-09-28 PROCEDURE — 87086 URINE CULTURE/COLONY COUNT: CPT

## 2022-09-28 PROCEDURE — 84100 ASSAY OF PHOSPHORUS: CPT

## 2022-09-28 PROCEDURE — 85730 THROMBOPLASTIN TIME PARTIAL: CPT

## 2022-09-28 PROCEDURE — 71046 X-RAY EXAM CHEST 2 VIEWS: CPT

## 2022-09-28 PROCEDURE — 81003 URINALYSIS AUTO W/O SCOPE: CPT

## 2022-09-28 NOTE — PROGRESS NOTES
9/28/22 - Patient instructed on Dr. Margot Gilford Hysterectomy orders:    Magnesium citrate 10 oz bottle.  Drink 1/2 bottle

## 2022-09-29 LAB
CULTURE: NORMAL
Lab: NORMAL
SPECIMEN: NORMAL

## 2022-10-03 ENCOUNTER — ANESTHESIA EVENT (OUTPATIENT)
Dept: OPERATING ROOM | Age: 47
End: 2022-10-03
Payer: COMMERCIAL

## 2022-10-03 NOTE — PROGRESS NOTES
10/3/22 - Notified patient surgery @ [facility] on 10/4/22 @ 0800, arrival 0600. Understanding verbalized.

## 2022-10-03 NOTE — ANESTHESIA PRE PROCEDURE
Current medications:    No current facility-administered medications for this encounter.      Current Outpatient Medications   Medication Sig Dispense Refill    Dulaglutide (TRULICITY) 3 BA/1.0SG SOPN Inject 3 mg into the skin once a week Takes on Sunday      atorvastatin (LIPITOR) 10 MG tablet Take 10 mg by mouth daily      ibuprofen (ADVIL;MOTRIN) 200 MG tablet Take 200 mg by mouth every 6 hours as needed for Pain      tiZANidine (ZANAFLEX) 2 MG tablet Take 2 mg by mouth every 6 hours as needed      losartan (COZAAR) 50 MG tablet TAKE 1 TABLET BY MOUTH EVERY DAY      naproxen (NAPROSYN) 250 MG tablet Take 250 mg by mouth 2 times daily (with meals)      fenofibrate (TRIGLIDE) 160 MG tablet TAKE 1 TABLET BY MOUTH EVERY DAY      glimepiride (AMARYL) 4 MG tablet TAKE 1 TABLET BY MOUTH EVERY DAY      metFORMIN (GLUCOPHAGE) 500 MG tablet TAKE 1 TABLET BY MOUTH TWICE A DAY WITH MORNING AND EVENING MEALS      Continuous Blood Gluc Sensor (DEXCOM G6 SENSOR) MISC USE AS DIRECTED DX E11,9      cetirizine (ZYRTEC) 10 MG tablet Take 10 mg by mouth daily as needed for Allergies      Multiple Vitamins-Minerals (MULTI COMPLETE PO) Take by mouth      Probiotic Product (PROBIOTIC-10 PO) Take by mouth         Allergies:  No Known Allergies    Problem List:    Patient Active Problem List   Diagnosis Code    Irregular menstrual cycle N92.6    Sprain and strain of ankle S93.409A, S96.919A    Right carpal tunnel syndrome G56.01    S/P carpal tunnel release Z98.890       Past Medical History:        Diagnosis Date    Arthritis     Bilat knees    Depression     No meds as of 8/3/20    Diabetes mellitus ([facility] [address] 2020    Type II, had gestational diabetes 2001    Hyperlipidemia     Hypertension     Follows with PCP    Insomnia     Seasonal allergies        Past Surgical History:        Procedure Laterality Date    CARPAL TUNNEL RELEASE Right 8/10/2020    RIGHT CARPAL TUNNEL RELEASE performed by Mal Washburn MD at [facility] OR    DILATION AND CURETTAGE OF UTERUS  2018    ENDOMETRIAL ABLATION  2019    KNEE ARTHROSCOPY Right 1989    TONSILLECTOMY AND ADENOIDECTOMY  1989    TUBAL LIGATION  2018       Social History:    Social History     Tobacco Use    Smoking status: Former     Years: 1.00     Types: Cigarettes    Smokeless tobacco: Never    Tobacco comments:     Smoked off and on for a year as a teenager   Substance Use Topics    Alcohol use: Yes     Comment: Rarely 1 drink every 3-6 months                                Counseling given: Not Answered  Tobacco comments: Smoked off and on for a year as a teenager      Vital Signs (Current):   Vitals:    09/26/22 0959   Weight: 230 lb (104.3 kg)   Height: 5' 7\" (1.702 m)                                              BP Readings from Last 3 Encounters:   08/10/20 (!) 149/78   08/10/20 138/78   08/03/20 (!) 167/92       NPO Status:                                                                                 BMI:   Wt Readings from Last 3 Encounters:   12/01/20 227 lb (103 kg)   11/05/20 227 lb (103 kg)   10/08/20 227 lb (103 kg)     Body mass index is 36.02 kg/m².     CBC:   Lab Results   Component Value Date/Time    WBC 7.2 09/28/2022 10:55 AM    RBC 4.44 09/28/2022 10:55 AM    HGB 14.3 09/28/2022 10:55 AM    HCT 43.9 09/28/2022 10:55 AM    MCV 98.9 09/28/2022 10:55 AM    RDW 12.1 09/28/2022 10:55 AM     09/28/2022 10:55 AM       CMP:   Lab Results   Component Value Date/Time     09/28/2022 10:55 AM    K 4.0 09/28/2022 10:55 AM     09/28/2022 10:55 AM    CO2 23 09/28/2022 10:55 AM    BUN 18 09/28/2022 10:55 AM    CREATININE 0.8 09/28/2022 10:55 AM    GFRAA >60 09/28/2022 10:55 AM    LABGLOM >60 09/28/2022 10:55 AM    GLUCOSE 112 09/28/2022 10:55 AM    PROT 6.7 09/28/2022 10:55 AM    CALCIUM 9.0 09/28/2022 10:55 AM    BILITOT 0.4 09/28/2022 10:55 AM    ALKPHOS 72 09/28/2022 10:55 AM    AST 21 09/28/2022 10:55 AM    ALT 30 09/28/2022 10:55 AM       POC Tests: No results for input(s): POCGLU, POCNA, POCK, POCCL, POCBUN, POCHEMO, POCHCT in the last 72 hours. Coags:   Lab Results   Component Value Date/Time    PROTIME 12.7 09/28/2022 10:55 AM    INR 0.98 09/28/2022 10:55 AM    APTT 32.8 09/28/2022 10:55 AM       HCG (If Applicable):   Lab Results   Component Value Date    PREGTESTUR Neg 06/09/2014        ABGs: No results found for: PHART, PO2ART, BPL4YQN, KVA9XID, BEART, T8CSDICV     Type & Screen (If Applicable):  No results found for: LABABO, LABRH    Drug/Infectious Status (If Applicable):  No results found for: HIV, HEPCAB    COVID-19 Screening (If Applicable):   Lab Results   Component Value Date/Time    COVID19 NOT DETECTED 08/03/2020 08:00 AM           Anesthesia Evaluation  Patient summary reviewed no history of anesthetic complications:   Airway: Mallampati: II  TM distance: >3 FB   Neck ROM: full  Mouth opening: > = 3 FB   Dental: normal exam         Pulmonary:normal exam                               Cardiovascular:  Exercise tolerance: good (>4 METS),   (+) hypertension:, hyperlipidemia         Beta Blocker:  Not on Beta Blocker         Neuro/Psych:   (+) depression/anxiety             GI/Hepatic/Renal:   (+) bowel prep, morbid obesity          Endo/Other:    (+) DiabetesType II DM, , : arthritis: OA., .                 Abdominal:   (+) obese,           Vascular: Other Findings:           Anesthesia Plan      general     ASA 2       Induction: intravenous. MIPS: Postoperative opioids intended and Prophylactic antiemetics administered. Anesthetic plan and risks discussed with patient and mother. Use of blood products discussed with patient whom consented to blood products. Plan discussed with CRNA.                     ERNIE Gray - DANIELLE   10/3/2022

## 2022-10-04 ENCOUNTER — HOSPITAL ENCOUNTER (OUTPATIENT)
Age: 47
Setting detail: OBSERVATION
Discharge: HOME | End: 2022-10-05
Attending: OBSTETRICS & GYNECOLOGY | Admitting: OBSTETRICS & GYNECOLOGY
Payer: COMMERCIAL

## 2022-10-04 ENCOUNTER — ANESTHESIA (OUTPATIENT)
Dept: OPERATING ROOM | Age: 47
End: 2022-10-04
Payer: COMMERCIAL

## 2022-10-04 DIAGNOSIS — Z01.818 PRE-OP TESTING: Primary | ICD-10-CM

## 2022-10-04 DIAGNOSIS — N81.9 FEMALE GENITAL PROLAPSE, UNSPECIFIED TYPE: ICD-10-CM

## 2022-10-04 DIAGNOSIS — N39.3 SI (STRESS INCONTINENCE), FEMALE: ICD-10-CM

## 2022-10-04 DIAGNOSIS — N81.4 UTERINE PROLAPSE: ICD-10-CM

## 2022-10-04 DIAGNOSIS — N81.2 CYSTOCELE WITH SECOND DEGREE UTERINE PROLAPSE: ICD-10-CM

## 2022-10-04 LAB
GLUCOSE BLD-MCNC: 152 MG/DL (ref 70–99)
GLUCOSE BLD-MCNC: 162 MG/DL (ref 70–99)
GLUCOSE BLD-MCNC: 178 MG/DL (ref 70–99)
PREGNANCY TEST URINE, POC: NEGATIVE

## 2022-10-04 PROCEDURE — 3600000009 HC SURGERY ROBOT BASE: Performed by: OBSTETRICS & GYNECOLOGY

## 2022-10-04 PROCEDURE — 3600000019 HC SURGERY ROBOT ADDTL 15MIN: Performed by: OBSTETRICS & GYNECOLOGY

## 2022-10-04 PROCEDURE — 7100000001 HC PACU RECOVERY - ADDTL 15 MIN: Performed by: OBSTETRICS & GYNECOLOGY

## 2022-10-04 PROCEDURE — 6360000002 HC RX W HCPCS: Performed by: OBSTETRICS & GYNECOLOGY

## 2022-10-04 PROCEDURE — 6360000002 HC RX W HCPCS: Performed by: SPECIALIST

## 2022-10-04 PROCEDURE — 2500000003 HC RX 250 WO HCPCS: Performed by: NURSE ANESTHETIST, CERTIFIED REGISTERED

## 2022-10-04 PROCEDURE — 2580000003 HC RX 258: Performed by: NURSE ANESTHETIST, CERTIFIED REGISTERED

## 2022-10-04 PROCEDURE — G0378 HOSPITAL OBSERVATION PER HR: HCPCS

## 2022-10-04 PROCEDURE — C1771 REP DEV, URINARY, W/SLING: HCPCS | Performed by: OBSTETRICS & GYNECOLOGY

## 2022-10-04 PROCEDURE — 2580000003 HC RX 258: Performed by: OBSTETRICS & GYNECOLOGY

## 2022-10-04 PROCEDURE — S2900 ROBOTIC SURGICAL SYSTEM: HCPCS | Performed by: OBSTETRICS & GYNECOLOGY

## 2022-10-04 PROCEDURE — 2580000003 HC RX 258: Performed by: SPECIALIST

## 2022-10-04 PROCEDURE — 2500000003 HC RX 250 WO HCPCS: Performed by: OBSTETRICS & GYNECOLOGY

## 2022-10-04 PROCEDURE — 88305 TISSUE EXAM BY PATHOLOGIST: CPT

## 2022-10-04 PROCEDURE — 6360000002 HC RX W HCPCS: Performed by: NURSE ANESTHETIST, CERTIFIED REGISTERED

## 2022-10-04 PROCEDURE — C1781 MESH (IMPLANTABLE): HCPCS | Performed by: OBSTETRICS & GYNECOLOGY

## 2022-10-04 PROCEDURE — 7100000000 HC PACU RECOVERY - FIRST 15 MIN: Performed by: OBSTETRICS & GYNECOLOGY

## 2022-10-04 PROCEDURE — 82962 GLUCOSE BLOOD TEST: CPT

## 2022-10-04 PROCEDURE — 2709999900 HC NON-CHARGEABLE SUPPLY: Performed by: OBSTETRICS & GYNECOLOGY

## 2022-10-04 PROCEDURE — C9290 INJ, BUPIVACAINE LIPOSOME: HCPCS | Performed by: OBSTETRICS & GYNECOLOGY

## 2022-10-04 PROCEDURE — 2720000010 HC SURG SUPPLY STERILE: Performed by: OBSTETRICS & GYNECOLOGY

## 2022-10-04 PROCEDURE — 3700000001 HC ADD 15 MINUTES (ANESTHESIA): Performed by: OBSTETRICS & GYNECOLOGY

## 2022-10-04 PROCEDURE — 81025 URINE PREGNANCY TEST: CPT

## 2022-10-04 PROCEDURE — C1758 CATHETER, URETERAL: HCPCS | Performed by: OBSTETRICS & GYNECOLOGY

## 2022-10-04 PROCEDURE — 2500000003 HC RX 250 WO HCPCS: Performed by: SPECIALIST

## 2022-10-04 PROCEDURE — 6370000000 HC RX 637 (ALT 250 FOR IP): Performed by: OBSTETRICS & GYNECOLOGY

## 2022-10-04 PROCEDURE — 3700000000 HC ANESTHESIA ATTENDED CARE: Performed by: OBSTETRICS & GYNECOLOGY

## 2022-10-04 DEVICE — TRANSOBTURATOR SLING SYSTEM WITH PRECISIONBLUE™ DESIGN
Type: IMPLANTABLE DEVICE | Status: FUNCTIONAL
Brand: OBTRYX™ II SYSTEM - HALO

## 2022-10-04 DEVICE — KIT SURG PROC UPSYLON Y SHP MESH FLAT SUT SURF MULT DIR: Type: IMPLANTABLE DEVICE | Site: BLADDER | Status: FUNCTIONAL

## 2022-10-04 RX ORDER — KETAMINE HCL 50MG/ML(1)
SYRINGE (ML) INTRAVENOUS PRN
Status: DISCONTINUED | OUTPATIENT
Start: 2022-10-04 | End: 2022-10-04 | Stop reason: SDUPTHER

## 2022-10-04 RX ORDER — ROCURONIUM BROMIDE 10 MG/ML
INJECTION, SOLUTION INTRAVENOUS PRN
Status: DISCONTINUED | OUTPATIENT
Start: 2022-10-04 | End: 2022-10-04 | Stop reason: SDUPTHER

## 2022-10-04 RX ORDER — ONDANSETRON 2 MG/ML
4 INJECTION INTRAMUSCULAR; INTRAVENOUS
Status: DISCONTINUED | OUTPATIENT
Start: 2022-10-04 | End: 2022-10-04 | Stop reason: HOSPADM

## 2022-10-04 RX ORDER — SODIUM CHLORIDE 9 MG/ML
INJECTION, SOLUTION INTRAVENOUS PRN
Status: DISCONTINUED | OUTPATIENT
Start: 2022-10-04 | End: 2022-10-05 | Stop reason: HOSPADM

## 2022-10-04 RX ORDER — SODIUM CHLORIDE 0.9 % (FLUSH) 0.9 %
5-40 SYRINGE (ML) INJECTION EVERY 12 HOURS SCHEDULED
Status: DISCONTINUED | OUTPATIENT
Start: 2022-10-04 | End: 2022-10-05 | Stop reason: HOSPADM

## 2022-10-04 RX ORDER — PROPOFOL 10 MG/ML
INJECTION, EMULSION INTRAVENOUS PRN
Status: DISCONTINUED | OUTPATIENT
Start: 2022-10-04 | End: 2022-10-04 | Stop reason: SDUPTHER

## 2022-10-04 RX ORDER — OXYCODONE HYDROCHLORIDE 5 MG/1
5 TABLET ORAL PRN
Status: DISCONTINUED | OUTPATIENT
Start: 2022-10-04 | End: 2022-10-04 | Stop reason: HOSPADM

## 2022-10-04 RX ORDER — ONDANSETRON 2 MG/ML
4 INJECTION INTRAMUSCULAR; INTRAVENOUS EVERY 6 HOURS PRN
Status: DISCONTINUED | OUTPATIENT
Start: 2022-10-04 | End: 2022-10-05 | Stop reason: HOSPADM

## 2022-10-04 RX ORDER — FENTANYL CITRATE 50 UG/ML
25 INJECTION, SOLUTION INTRAMUSCULAR; INTRAVENOUS EVERY 5 MIN PRN
Status: DISCONTINUED | OUTPATIENT
Start: 2022-10-04 | End: 2022-10-04 | Stop reason: HOSPADM

## 2022-10-04 RX ORDER — ESMOLOL HYDROCHLORIDE 10 MG/ML
INJECTION INTRAVENOUS PRN
Status: DISCONTINUED | OUTPATIENT
Start: 2022-10-04 | End: 2022-10-04 | Stop reason: SDUPTHER

## 2022-10-04 RX ORDER — ATORVASTATIN CALCIUM 40 MG/1
40 TABLET, FILM COATED ORAL DAILY
Status: DISCONTINUED | OUTPATIENT
Start: 2022-10-04 | End: 2022-10-04

## 2022-10-04 RX ORDER — SODIUM CHLORIDE, SODIUM LACTATE, POTASSIUM CHLORIDE, CALCIUM CHLORIDE 600; 310; 30; 20 MG/100ML; MG/100ML; MG/100ML; MG/100ML
INJECTION, SOLUTION INTRAVENOUS CONTINUOUS PRN
Status: DISCONTINUED | OUTPATIENT
Start: 2022-10-04 | End: 2022-10-04 | Stop reason: SDUPTHER

## 2022-10-04 RX ORDER — SODIUM CHLORIDE 0.9 % (FLUSH) 0.9 %
5-40 SYRINGE (ML) INJECTION EVERY 12 HOURS SCHEDULED
Status: DISCONTINUED | OUTPATIENT
Start: 2022-10-04 | End: 2022-10-04 | Stop reason: HOSPADM

## 2022-10-04 RX ORDER — HYDRALAZINE HYDROCHLORIDE 20 MG/ML
10 INJECTION INTRAMUSCULAR; INTRAVENOUS
Status: DISCONTINUED | OUTPATIENT
Start: 2022-10-04 | End: 2022-10-04 | Stop reason: HOSPADM

## 2022-10-04 RX ORDER — PHENYLEPHRINE HCL IN 0.9% NACL 1 MG/10 ML
SYRINGE (ML) INTRAVENOUS PRN
Status: DISCONTINUED | OUTPATIENT
Start: 2022-10-04 | End: 2022-10-04 | Stop reason: SDUPTHER

## 2022-10-04 RX ORDER — SODIUM CHLORIDE 0.9 % (FLUSH) 0.9 %
5-40 SYRINGE (ML) INJECTION PRN
Status: DISCONTINUED | OUTPATIENT
Start: 2022-10-04 | End: 2022-10-04 | Stop reason: HOSPADM

## 2022-10-04 RX ORDER — ENOXAPARIN SODIUM 100 MG/ML
40 INJECTION SUBCUTANEOUS DAILY
Status: DISCONTINUED | OUTPATIENT
Start: 2022-10-05 | End: 2022-10-05 | Stop reason: HOSPADM

## 2022-10-04 RX ORDER — IPRATROPIUM BROMIDE AND ALBUTEROL SULFATE 2.5; .5 MG/3ML; MG/3ML
1 SOLUTION RESPIRATORY (INHALATION)
Status: DISCONTINUED | OUTPATIENT
Start: 2022-10-04 | End: 2022-10-04 | Stop reason: HOSPADM

## 2022-10-04 RX ORDER — MIDAZOLAM HYDROCHLORIDE 1 MG/ML
INJECTION INTRAMUSCULAR; INTRAVENOUS PRN
Status: DISCONTINUED | OUTPATIENT
Start: 2022-10-04 | End: 2022-10-04 | Stop reason: SDUPTHER

## 2022-10-04 RX ORDER — ATORVASTATIN CALCIUM 40 MG/1
40 TABLET, FILM COATED ORAL DAILY
Status: DISCONTINUED | OUTPATIENT
Start: 2022-10-05 | End: 2022-10-05 | Stop reason: HOSPADM

## 2022-10-04 RX ORDER — GLYCOPYRROLATE 1 MG/5 ML
SYRINGE (ML) INTRAVENOUS PRN
Status: DISCONTINUED | OUTPATIENT
Start: 2022-10-04 | End: 2022-10-04 | Stop reason: SDUPTHER

## 2022-10-04 RX ORDER — SODIUM CHLORIDE, SODIUM LACTATE, POTASSIUM CHLORIDE, CALCIUM CHLORIDE 600; 310; 30; 20 MG/100ML; MG/100ML; MG/100ML; MG/100ML
INJECTION, SOLUTION INTRAVENOUS CONTINUOUS
Status: DISCONTINUED | OUTPATIENT
Start: 2022-10-04 | End: 2022-10-05 | Stop reason: HOSPADM

## 2022-10-04 RX ORDER — DEXAMETHASONE SODIUM PHOSPHATE 4 MG/ML
INJECTION, SOLUTION INTRA-ARTICULAR; INTRALESIONAL; INTRAMUSCULAR; INTRAVENOUS; SOFT TISSUE PRN
Status: DISCONTINUED | OUTPATIENT
Start: 2022-10-04 | End: 2022-10-04 | Stop reason: SDUPTHER

## 2022-10-04 RX ORDER — LIDOCAINE HYDROCHLORIDE 20 MG/ML
INJECTION, SOLUTION INTRAVENOUS PRN
Status: DISCONTINUED | OUTPATIENT
Start: 2022-10-04 | End: 2022-10-04 | Stop reason: SDUPTHER

## 2022-10-04 RX ORDER — DEXTROSE MONOHYDRATE 100 MG/ML
INJECTION, SOLUTION INTRAVENOUS CONTINUOUS PRN
Status: DISCONTINUED | OUTPATIENT
Start: 2022-10-04 | End: 2022-10-05 | Stop reason: HOSPADM

## 2022-10-04 RX ORDER — SODIUM CHLORIDE 9 MG/ML
25 INJECTION, SOLUTION INTRAVENOUS PRN
Status: DISCONTINUED | OUTPATIENT
Start: 2022-10-04 | End: 2022-10-04 | Stop reason: HOSPADM

## 2022-10-04 RX ORDER — GLIMEPIRIDE 4 MG/1
4 TABLET ORAL DAILY
Status: DISCONTINUED | OUTPATIENT
Start: 2022-10-04 | End: 2022-10-05 | Stop reason: HOSPADM

## 2022-10-04 RX ORDER — KETOROLAC TROMETHAMINE 30 MG/ML
INJECTION, SOLUTION INTRAMUSCULAR; INTRAVENOUS PRN
Status: DISCONTINUED | OUTPATIENT
Start: 2022-10-04 | End: 2022-10-04 | Stop reason: SDUPTHER

## 2022-10-04 RX ORDER — ONDANSETRON 4 MG/1
4 TABLET, ORALLY DISINTEGRATING ORAL EVERY 8 HOURS PRN
Status: DISCONTINUED | OUTPATIENT
Start: 2022-10-04 | End: 2022-10-05 | Stop reason: HOSPADM

## 2022-10-04 RX ORDER — OXYCODONE HYDROCHLORIDE 5 MG/1
10 TABLET ORAL PRN
Status: DISCONTINUED | OUTPATIENT
Start: 2022-10-04 | End: 2022-10-04 | Stop reason: HOSPADM

## 2022-10-04 RX ORDER — KETOROLAC TROMETHAMINE 10 MG/1
10 TABLET, FILM COATED ORAL EVERY 6 HOURS PRN
Status: DISCONTINUED | OUTPATIENT
Start: 2022-10-04 | End: 2022-10-05 | Stop reason: HOSPADM

## 2022-10-04 RX ORDER — INDOCYANINE GREEN AND WATER 25 MG
5 KIT INJECTION ONCE
Status: COMPLETED | OUTPATIENT
Start: 2022-10-04 | End: 2022-10-04

## 2022-10-04 RX ORDER — SODIUM CHLORIDE 0.9 % (FLUSH) 0.9 %
5-40 SYRINGE (ML) INJECTION PRN
Status: DISCONTINUED | OUTPATIENT
Start: 2022-10-04 | End: 2022-10-05 | Stop reason: HOSPADM

## 2022-10-04 RX ORDER — FENOFIBRATE 160 MG/1
160 TABLET ORAL DAILY
Status: DISCONTINUED | OUTPATIENT
Start: 2022-10-05 | End: 2022-10-05 | Stop reason: HOSPADM

## 2022-10-04 RX ORDER — LABETALOL HYDROCHLORIDE 5 MG/ML
10 INJECTION, SOLUTION INTRAVENOUS
Status: DISCONTINUED | OUTPATIENT
Start: 2022-10-04 | End: 2022-10-04 | Stop reason: HOSPADM

## 2022-10-04 RX ORDER — DROPERIDOL 2.5 MG/ML
0.62 INJECTION, SOLUTION INTRAMUSCULAR; INTRAVENOUS
Status: DISCONTINUED | OUTPATIENT
Start: 2022-10-04 | End: 2022-10-04 | Stop reason: HOSPADM

## 2022-10-04 RX ORDER — HYDROCODONE BITARTRATE AND ACETAMINOPHEN 5; 325 MG/1; MG/1
1 TABLET ORAL EVERY 6 HOURS PRN
Status: DISCONTINUED | OUTPATIENT
Start: 2022-10-04 | End: 2022-10-05 | Stop reason: HOSPADM

## 2022-10-04 RX ORDER — NEOSTIGMINE METHYLSULFATE 5 MG/5 ML
SYRINGE (ML) INTRAVENOUS PRN
Status: DISCONTINUED | OUTPATIENT
Start: 2022-10-04 | End: 2022-10-04 | Stop reason: SDUPTHER

## 2022-10-04 RX ORDER — ONDANSETRON 2 MG/ML
INJECTION INTRAMUSCULAR; INTRAVENOUS PRN
Status: DISCONTINUED | OUTPATIENT
Start: 2022-10-04 | End: 2022-10-04 | Stop reason: SDUPTHER

## 2022-10-04 RX ORDER — BUPIVACAINE HYDROCHLORIDE 5 MG/ML
INJECTION, SOLUTION EPIDURAL; INTRACAUDAL
Status: COMPLETED | OUTPATIENT
Start: 2022-10-04 | End: 2022-10-04

## 2022-10-04 RX ORDER — SODIUM CHLORIDE, SODIUM LACTATE, POTASSIUM CHLORIDE, CALCIUM CHLORIDE 600; 310; 30; 20 MG/100ML; MG/100ML; MG/100ML; MG/100ML
INJECTION, SOLUTION INTRAVENOUS CONTINUOUS
Status: DISCONTINUED | OUTPATIENT
Start: 2022-10-04 | End: 2022-10-04

## 2022-10-04 RX ORDER — FENTANYL CITRATE 50 UG/ML
INJECTION, SOLUTION INTRAMUSCULAR; INTRAVENOUS PRN
Status: DISCONTINUED | OUTPATIENT
Start: 2022-10-04 | End: 2022-10-04 | Stop reason: SDUPTHER

## 2022-10-04 RX ADMIN — SODIUM CHLORIDE, POTASSIUM CHLORIDE, SODIUM LACTATE AND CALCIUM CHLORIDE: 600; 310; 30; 20 INJECTION, SOLUTION INTRAVENOUS at 09:40

## 2022-10-04 RX ADMIN — HYDROMORPHONE HYDROCHLORIDE 0.5 MG: 1 INJECTION, SOLUTION INTRAMUSCULAR; INTRAVENOUS; SUBCUTANEOUS at 09:07

## 2022-10-04 RX ADMIN — HYDROMORPHONE HYDROCHLORIDE 0.5 MG: 1 INJECTION, SOLUTION INTRAMUSCULAR; INTRAVENOUS; SUBCUTANEOUS at 15:54

## 2022-10-04 RX ADMIN — SODIUM CHLORIDE 1500 MG: 900 INJECTION INTRAVENOUS at 08:26

## 2022-10-04 RX ADMIN — Medication 25 MG: at 08:19

## 2022-10-04 RX ADMIN — DEXAMETHASONE SODIUM PHOSPHATE 4 MG: 4 INJECTION, SOLUTION INTRAMUSCULAR; INTRAVENOUS at 08:41

## 2022-10-04 RX ADMIN — ROCURONIUM BROMIDE 20 MG: 10 INJECTION, SOLUTION INTRAVENOUS at 11:48

## 2022-10-04 RX ADMIN — ROCURONIUM BROMIDE 20 MG: 10 INJECTION, SOLUTION INTRAVENOUS at 12:19

## 2022-10-04 RX ADMIN — SODIUM CHLORIDE, POTASSIUM CHLORIDE, SODIUM LACTATE AND CALCIUM CHLORIDE: 600; 310; 30; 20 INJECTION, SOLUTION INTRAVENOUS at 22:34

## 2022-10-04 RX ADMIN — PROPOFOL 150 MG: 10 INJECTION, EMULSION INTRAVENOUS at 08:19

## 2022-10-04 RX ADMIN — ROCURONIUM BROMIDE 30 MG: 10 INJECTION, SOLUTION INTRAVENOUS at 08:52

## 2022-10-04 RX ADMIN — Medication 100 MCG: at 08:46

## 2022-10-04 RX ADMIN — AMPICILLIN SODIUM AND SULBACTAM SODIUM 1500 MG: 1; .5 INJECTION, POWDER, FOR SOLUTION INTRAMUSCULAR; INTRAVENOUS at 22:42

## 2022-10-04 RX ADMIN — FENTANYL CITRATE 50 MCG: 50 INJECTION, SOLUTION INTRAMUSCULAR; INTRAVENOUS at 08:14

## 2022-10-04 RX ADMIN — FENTANYL CITRATE 50 MCG: 50 INJECTION, SOLUTION INTRAMUSCULAR; INTRAVENOUS at 09:06

## 2022-10-04 RX ADMIN — INDOCYANINE GREEN 5 MG: KIT INTRAVENOUS at 12:40

## 2022-10-04 RX ADMIN — ROCURONIUM BROMIDE 15 MG: 10 INJECTION, SOLUTION INTRAVENOUS at 10:44

## 2022-10-04 RX ADMIN — CEFAZOLIN 2000 MG: 2 INJECTION, POWDER, FOR SOLUTION INTRAMUSCULAR; INTRAVENOUS at 08:26

## 2022-10-04 RX ADMIN — ONDANSETRON 4 MG: 2 INJECTION INTRAMUSCULAR; INTRAVENOUS at 13:14

## 2022-10-04 RX ADMIN — KETOROLAC TROMETHAMINE 10 MG: 10 TABLET, FILM COATED ORAL at 18:20

## 2022-10-04 RX ADMIN — ONDANSETRON 4 MG: 2 INJECTION INTRAMUSCULAR; INTRAVENOUS at 16:43

## 2022-10-04 RX ADMIN — AMPICILLIN SODIUM AND SULBACTAM SODIUM 1500 MG: 1; .5 INJECTION, POWDER, FOR SOLUTION INTRAMUSCULAR; INTRAVENOUS at 16:42

## 2022-10-04 RX ADMIN — SODIUM CHLORIDE, POTASSIUM CHLORIDE, SODIUM LACTATE AND CALCIUM CHLORIDE: 600; 310; 30; 20 INJECTION, SOLUTION INTRAVENOUS at 08:25

## 2022-10-04 RX ADMIN — ROCURONIUM BROMIDE 50 MG: 10 INJECTION, SOLUTION INTRAVENOUS at 08:19

## 2022-10-04 RX ADMIN — HYDROMORPHONE HYDROCHLORIDE 0.5 MG: 1 INJECTION, SOLUTION INTRAMUSCULAR; INTRAVENOUS; SUBCUTANEOUS at 08:56

## 2022-10-04 RX ADMIN — ROCURONIUM BROMIDE 20 MG: 10 INJECTION, SOLUTION INTRAVENOUS at 09:39

## 2022-10-04 RX ADMIN — ROCURONIUM BROMIDE 20 MG: 10 INJECTION, SOLUTION INTRAVENOUS at 11:14

## 2022-10-04 RX ADMIN — Medication 100 MCG: at 13:14

## 2022-10-04 RX ADMIN — MIDAZOLAM 2 MG: 1 INJECTION INTRAMUSCULAR; INTRAVENOUS at 08:08

## 2022-10-04 RX ADMIN — LIDOCAINE HYDROCHLORIDE 100 MG: 20 INJECTION, SOLUTION INTRAVENOUS at 08:19

## 2022-10-04 RX ADMIN — HYDROMORPHONE HYDROCHLORIDE 0.5 MG: 1 INJECTION, SOLUTION INTRAMUSCULAR; INTRAVENOUS; SUBCUTANEOUS at 10:00

## 2022-10-04 RX ADMIN — HYDROCODONE BITARTRATE AND ACETAMINOPHEN 1 TABLET: 5; 325 TABLET ORAL at 20:25

## 2022-10-04 RX ADMIN — Medication 3 MG: at 13:21

## 2022-10-04 RX ADMIN — Medication 25 MG: at 13:02

## 2022-10-04 RX ADMIN — Medication 100 MCG: at 08:33

## 2022-10-04 RX ADMIN — ESMOLOL HYDROCHLORIDE 20 MG: 10 INJECTION, SOLUTION INTRAVENOUS at 13:03

## 2022-10-04 RX ADMIN — ESMOLOL HYDROCHLORIDE 20 MG: 10 INJECTION, SOLUTION INTRAVENOUS at 11:21

## 2022-10-04 RX ADMIN — HYDROMORPHONE HYDROCHLORIDE 0.5 MG: 1 INJECTION, SOLUTION INTRAMUSCULAR; INTRAVENOUS; SUBCUTANEOUS at 11:41

## 2022-10-04 RX ADMIN — SODIUM CHLORIDE, POTASSIUM CHLORIDE, SODIUM LACTATE AND CALCIUM CHLORIDE: 600; 310; 30; 20 INJECTION, SOLUTION INTRAVENOUS at 13:52

## 2022-10-04 RX ADMIN — HYDROMORPHONE HYDROCHLORIDE 0.5 MG: 1 INJECTION, SOLUTION INTRAMUSCULAR; INTRAVENOUS; SUBCUTANEOUS at 22:34

## 2022-10-04 RX ADMIN — ESMOLOL HYDROCHLORIDE 20 MG: 10 INJECTION, SOLUTION INTRAVENOUS at 12:26

## 2022-10-04 RX ADMIN — ROCURONIUM BROMIDE 15 MG: 10 INJECTION, SOLUTION INTRAVENOUS at 10:09

## 2022-10-04 RX ADMIN — Medication 0.4 MG: at 13:21

## 2022-10-04 RX ADMIN — GLIMEPIRIDE 4 MG: 4 TABLET ORAL at 18:20

## 2022-10-04 RX ADMIN — KETOROLAC TROMETHAMINE 30 MG: 30 INJECTION, SOLUTION INTRAMUSCULAR; INTRAVENOUS at 13:14

## 2022-10-04 RX ADMIN — CEFAZOLIN 2000 MG: 2 INJECTION, POWDER, FOR SOLUTION INTRAMUSCULAR; INTRAVENOUS at 12:14

## 2022-10-04 RX ADMIN — SODIUM CHLORIDE, POTASSIUM CHLORIDE, SODIUM LACTATE AND CALCIUM CHLORIDE: 600; 310; 30; 20 INJECTION, SOLUTION INTRAVENOUS at 08:11

## 2022-10-04 ASSESSMENT — PAIN DESCRIPTION - LOCATION
LOCATION: ABDOMEN
LOCATION: BACK
LOCATION: ABDOMEN

## 2022-10-04 ASSESSMENT — PAIN DESCRIPTION - ORIENTATION
ORIENTATION: LOWER
ORIENTATION: LOWER
ORIENTATION: MID;LOWER
ORIENTATION: LOWER;MID
ORIENTATION: MID;LOWER
ORIENTATION: LOWER
ORIENTATION: MID;LOWER;RIGHT

## 2022-10-04 ASSESSMENT — PAIN SCALES - GENERAL
PAINLEVEL_OUTOF10: 4
PAINLEVEL_OUTOF10: 5
PAINLEVEL_OUTOF10: 4
PAINLEVEL_OUTOF10: 3
PAINLEVEL_OUTOF10: 0
PAINLEVEL_OUTOF10: 4
PAINLEVEL_OUTOF10: 0
PAINLEVEL_OUTOF10: 6
PAINLEVEL_OUTOF10: 6

## 2022-10-04 ASSESSMENT — PAIN DESCRIPTION - FREQUENCY
FREQUENCY: INTERMITTENT

## 2022-10-04 ASSESSMENT — PAIN - FUNCTIONAL ASSESSMENT
PAIN_FUNCTIONAL_ASSESSMENT: PREVENTS OR INTERFERES SOME ACTIVE ACTIVITIES AND ADLS
PAIN_FUNCTIONAL_ASSESSMENT: ACTIVITIES ARE NOT PREVENTED
PAIN_FUNCTIONAL_ASSESSMENT: 0-10
PAIN_FUNCTIONAL_ASSESSMENT: ACTIVITIES ARE NOT PREVENTED
PAIN_FUNCTIONAL_ASSESSMENT: PREVENTS OR INTERFERES SOME ACTIVE ACTIVITIES AND ADLS
PAIN_FUNCTIONAL_ASSESSMENT: ACTIVITIES ARE NOT PREVENTED

## 2022-10-04 ASSESSMENT — PAIN DESCRIPTION - DESCRIPTORS
DESCRIPTORS: ACHING;DISCOMFORT
DESCRIPTORS: ACHING
DESCRIPTORS: CRAMPING;DISCOMFORT
DESCRIPTORS: DISCOMFORT
DESCRIPTORS: CRAMPING;DISCOMFORT
DESCRIPTORS: ACHING;CRAMPING
DESCRIPTORS: ACHING;DISCOMFORT
DESCRIPTORS: ACHING;DISCOMFORT

## 2022-10-04 ASSESSMENT — PAIN DESCRIPTION - PAIN TYPE
TYPE: SURGICAL PAIN

## 2022-10-04 ASSESSMENT — PAIN DESCRIPTION - ONSET
ONSET: GRADUAL
ONSET: ON-GOING
ONSET: GRADUAL
ONSET: ON-GOING
ONSET: GRADUAL

## 2022-10-04 NOTE — BRIEF OP NOTE
Brief Postoperative Note      Patient: Jeyson Landa  YOB: 1975  MRN: [id number]    Date of Procedure: 10/4/2022    Pre-Op Diagnosis: Uterine prolapse [N81.4]    Post-Op Diagnosis: Same       Procedure: Robotic Sacrocolpopexy    Surgeon(s): MD Pratima Youssef DO    Assistant:  First Assistant: Clementine Ramsey    Anesthesia: General    Estimated Blood Loss (mL): Minimal    Complications: None    Specimens:   ID Type Source Tests Collected by Time Destination   A : Uterus and Cervix and fallopian tubes Tissue Tissue SURGICAL PATHOLOGY Pratima Rivera DO 10/4/2022 9809        Implants:  Implant Name Type Inv. Item Serial No.  Lot No. LRB No. Used Action   [facility] MESH FLAT SUT SURF MULT DIR - [id number]  KIT SURG [facility] MESH FLAT SUT SURF MULT DIR  [employer]- [id number] N/A 1 Implanted   SLING GYN TRANSOBTURATOR MID URETH HALO W/ [employer] - [id number]  SLING GYN TRANSOBTURATOR MID URETH HALO W/ [address]  [place] [id number] N/A 1 Implanted         Drains:   Urinary Catheter 10/04/22 Cleaning (Active)   Urine Color Yellow 10/04/22 1340   Urine Appearance Clear 10/04/22 1340       Findings:   Uterine prolapse with larger cystocele component. Slow drainage from right ureter but no obstruction  Retracted urethra and bladder wall and thick lateral thigh causing too great a length for TOT sling. Sling not performed.        [id number]    Electronically signed by Melvin Tarango MD on 10/4/2022 at 1:45 PM

## 2022-10-04 NOTE — OP NOTE
[address] [place] [address], [address] [place] [address]                                OPERATIVE REPORT    PATIENT NAME: Marva Zamora                  :        1975  MED REC NO:   [id number]                          ROOM:       [facility]  ACCOUNT NO:   [de-identified]                           ADMIT DATE: 10/04/2022  PROVIDER:     Shalini Bills MD    DATE OF PROCEDURE:  10/04/2022    PREOPERATIVE DIAGNOSIS:  Uterine prolapse. POSTOPERATIVE DIAGNOSIS:  Uterine prolapse. OPERATION PERFORMED:  Robotic-assisted laparoscopic sacrocolpopexy. SURGEON:  Shalini Bills MD    ANESTHESIA IN THE CASE:  General.    ESTIMATED BLOOD LOSS:  Minimal.    COMPLICATIONS:  None. DRAINS LEFT IN PLACE:  A 16-Beninese Cleaning. INDICATIONS FOR THE PROCEDURE:  The patient is a 54-year-old female who  presented with a greater than six-month history of uterine prolapse. She is noting pelvic pressure and urinary incontinence. She had failed  a pessary. Urodynamics failed to show urinary incontinence but she did  have incontinence upon examination. Ultrasound was negative, cystoscopy  was negative and she is scheduled for joint robotic sacrocolpopexy, TOT  sling, and hysterectomy with Dr. Jim Layton and myself. Risks and benefits  of the surgery were discussed with her including bleeding, infection,  urethral injury, bladder injury, rectal injury, bowel injury, and need  for additional surgical procedures and she is agreeable to it. DETAILS OF THE PROCEDURE:  After induction of general anesthesia, she  was placed in the low lithotomy position, she was tested in the steep  Trendelenburg position and brought back supine. She was prepped and  draped according to the sterile technique. A time-out was taken. All  plans agreed upon. IV antibiotics were given.   While Dr. Jim Layton attached  the device for the hysterectomy to the uterus and cervix, I made a small  5-mm incision above the umbilicus. Visual obturator was inserted into  the abdomen without difficulty. Two robotic ports were placed on the  right and two on the left and the 5-mm port was upsized to a robotic  port. Once that was complete and Dr. Javad Royal was complete, we placed the  patient into 25-degree Trendelenburg position. The robot was docked  without difficulty. The initial portion of the case was a hysterectomy,  done by Dr. Javad Royal. Once he was completed and the cuff was done, I sat  at the controls. We initially just dissected the anterior vaginal wall  off the bladder for approximately 4 to 5 cm and then did posteriorly. The posteriorly was a little tricky, as there appeared to be a small  vaginal including cyst and we dissected it free. Then, we actually put  a dilator in the rectum just to make sure there was no rectal injury and  that the dissection plane was correct. Once both sides were done, we  then placed the Y-shaped mesh onto the vaginal wall and secured it down  the anterior and posterior side with interrupted Cornell-Luis sutures. Once  that was complete, before tunneling at the initial portion of the case,  we dissected and found the sacral promontory and made a small incision  over the peritoneum just lateral to it. We dissected the bright shining  layer of the anterior longitudinal ligament. We created a tunnel  submucosally to the vaginal apex and now, once that had been done  initially, we were able to bring the mesh through that same tunnel using  a vaginal dilator to measure how much to support the vaginal apex. We  then secured the mesh to the anterior longitudinal ligament with  interrupted Cornell-Luis sutures. Once this was completed, the excess  portion of the mesh was cut away. The peritoneum was closed such that  there was no vaginal mesh visible. We irrigated the wound. Once  thoroughly irrigated, there was no bleeding.   The patient was given ICG  to help visualize the ureter for the cystoscopy. The robot was then  undocked. The ports were removed under direct vision and the incisions  were closed. Rigid cystoscopy was then performed, which showed no  injury to the bladder or urethra. We could see efflux coming from the  left side and the right side was somewhat slower. We placed the patient  completely in Trendelenburg position. We could see good steady drainage  but it was just slower and so, we went ahead and actually inserted a  catheter up the right side. It inserted all the way up to 25 cm with no  difficulty and the drainage was steady. So, I do not believe there was  any ureteral injury. Once that was complete, the Cleaning was reinserted  into the bladder but looking now with the vaginal apex as well as  support, the uterine prolapse corrected and the urethra had retracted  far enough back that I was concerned that the halo needles that will be  used for the TOT sling would not reach from the groin incisions all the  way around to the anterior vaginal wall. Looking at the length and  depth, it was well over 6 inches and so, I elected not to attempt to do  the sling at this time. We will discuss and reexamine her  postoperatively to see how much leakage she is having or if she is  having any then work accordingly. As mentioned, the catheter was left  to drainage. The patient was taken back to recovery once in stable  condition.         Elmer Barrera MD    D: 10/04/2022 13:54:55       T: 10/04/2022 18:58:26     [email or web address]  Job#: [id number]     Doc#: [id number]    CC:

## 2022-10-04 NOTE — PRE-PROCEDURE INSTRUCTIONS
Urine pregnancy negative   0807 Report given to [facility], RN.  9143 Ancef 2g handed off to [employer]

## 2022-10-04 NOTE — PROGRESS NOTES
1338 Pt arrived from OR. Monitors applied and alarms set. [facility]. [facility]. Groggy but follows commands. 1355 Taking ice chips. Denies pain at present. 1415 Report called to [facility]. Transport arranged.

## 2022-10-04 NOTE — OP NOTE
[address] [place] [address], [address] [place] [address]                                OPERATIVE REPORT    PATIENT NAME: Kenneth Carrington                  :        1975  MED REC NO:   [id number]                          ROOM:       [facility]  ACCOUNT NO:   [de-identified]                           ADMIT DATE: 10/04/2022  PROVIDER:     Zeb Ronquillo DO    DATE OF PROCEDURE:  10/04/2022    OPERATION PERFORMED:  Robotic laparoscopic assisted vaginal hysterectomy  with bilateral salpingectomies. SURGEON:  Zeb Ronquillo DO    ANESTHESIA:  General.    ESTIMATED BLOOD LOSS:  10 mL. COMPLICATIONS:  None. OPERATIVE FINDINGS:  Laparoscopic view of the abdominopelvic cavity  revealed all to be within normal limits with finding, however, of right  hydrosalpinx, consists probably accounting for the pain she has been  having in the right lower quadrant. The desire to maintain ovaries like  normal and that was the case. OPERATIVE PROCEDURE:  The patient was taken to the operating room and  after general anesthetic induction, she was sterilely prepped and draped  in the usual manner for this procedure. The BLANCO manipulator was placed  without difficulty. Cleaning catheter was placed in the bladder. The five  small laparoscopic skin incisions were placed while the BLANCO was being  placed by Dr. Scott Agudelo and with those ports placed and instruments in  place, the patient was placed in the 25-degree Trendelenburg position. The robot was brought in and docked to the appropriate instruments and  then I was able to go to the console for performance of the robotic  hysterectomy. Again, anatomy looked normal within the right  hydrosalpinx with cautery scissors and bipolar coagulation, the  fallopian tubes were  from the mesosalpinx adjacent to the  fallopian tube bilaterally.   These were left attached to the uterus for  removal of the two fallopian tubes at

## 2022-10-05 VITALS
DIASTOLIC BLOOD PRESSURE: 73 MMHG | SYSTOLIC BLOOD PRESSURE: 117 MMHG | HEART RATE: 99 BPM | BODY MASS INDEX: 36.1 KG/M2 | WEIGHT: 230 LBS | TEMPERATURE: 98 F | OXYGEN SATURATION: 96 % | HEIGHT: 67 IN | RESPIRATION RATE: 18 BRPM

## 2022-10-05 LAB
BASOPHILS ABSOLUTE: 0 K/CU MM
BASOPHILS RELATIVE PERCENT: 0.2 % (ref 0–1)
DIFFERENTIAL TYPE: ABNORMAL
EOSINOPHILS ABSOLUTE: 0.1 K/CU MM
EOSINOPHILS RELATIVE PERCENT: 0.8 % (ref 0–3)
GLUCOSE BLD-MCNC: 107 MG/DL (ref 70–99)
HCT VFR BLD CALC: 34.7 % (ref 37–47)
HEMOGLOBIN: 11.2 GM/DL (ref 12.5–16)
IMMATURE NEUTROPHIL %: 0.4 % (ref 0–0.43)
LYMPHOCYTES ABSOLUTE: 2.3 K/CU MM
LYMPHOCYTES RELATIVE PERCENT: 20.6 % (ref 24–44)
MCH RBC QN AUTO: 32.6 PG (ref 27–31)
MCHC RBC AUTO-ENTMCNC: 32.3 % (ref 32–36)
MCV RBC AUTO: 100.9 FL (ref 78–100)
MONOCYTES ABSOLUTE: 0.8 K/CU MM
MONOCYTES RELATIVE PERCENT: 7 % (ref 0–4)
NUCLEATED RBC %: 0 %
PDW BLD-RTO: 12.5 % (ref 11.7–14.9)
PLATELET # BLD: 208 K/CU MM (ref 140–440)
PMV BLD AUTO: 10.8 FL (ref 7.5–11.1)
RBC # BLD: 3.44 M/CU MM (ref 4.2–5.4)
SEGMENTED NEUTROPHILS ABSOLUTE COUNT: 8.1 K/CU MM
SEGMENTED NEUTROPHILS RELATIVE PERCENT: 71 % (ref 36–66)
TOTAL IMMATURE NEUTOROPHIL: 0.04 K/CU MM
TOTAL NUCLEATED RBC: 0 K/CU MM
WBC # BLD: 11.3 K/CU MM (ref 4–10.5)

## 2022-10-05 PROCEDURE — G0378 HOSPITAL OBSERVATION PER HR: HCPCS

## 2022-10-05 PROCEDURE — 82962 GLUCOSE BLOOD TEST: CPT

## 2022-10-05 PROCEDURE — 36415 COLL VENOUS BLD VENIPUNCTURE: CPT

## 2022-10-05 PROCEDURE — 6370000000 HC RX 637 (ALT 250 FOR IP): Performed by: OBSTETRICS & GYNECOLOGY

## 2022-10-05 PROCEDURE — 2580000003 HC RX 258: Performed by: OBSTETRICS & GYNECOLOGY

## 2022-10-05 PROCEDURE — 96372 THER/PROPH/DIAG INJ SC/IM: CPT

## 2022-10-05 PROCEDURE — 6360000002 HC RX W HCPCS: Performed by: OBSTETRICS & GYNECOLOGY

## 2022-10-05 PROCEDURE — 85025 COMPLETE CBC W/AUTO DIFF WBC: CPT

## 2022-10-05 RX ORDER — HYDROCODONE BITARTRATE AND ACETAMINOPHEN 5; 325 MG/1; MG/1
1 TABLET ORAL EVERY 6 HOURS PRN
Qty: 15 TABLET | Refills: 0 | Status: SHIPPED | OUTPATIENT
Start: 2022-10-05 | End: 2022-10-20

## 2022-10-05 RX ADMIN — GLIMEPIRIDE 4 MG: 4 TABLET ORAL at 10:19

## 2022-10-05 RX ADMIN — HYDROMORPHONE HYDROCHLORIDE 0.5 MG: 1 INJECTION, SOLUTION INTRAMUSCULAR; INTRAVENOUS; SUBCUTANEOUS at 05:05

## 2022-10-05 RX ADMIN — FENOFIBRATE 160 MG: 160 TABLET, FILM COATED ORAL at 10:19

## 2022-10-05 RX ADMIN — HYDROCODONE BITARTRATE AND ACETAMINOPHEN 1 TABLET: 5; 325 TABLET ORAL at 09:33

## 2022-10-05 RX ADMIN — ATORVASTATIN CALCIUM 40 MG: 40 TABLET, FILM COATED ORAL at 10:19

## 2022-10-05 RX ADMIN — SODIUM CHLORIDE, POTASSIUM CHLORIDE, SODIUM LACTATE AND CALCIUM CHLORIDE: 600; 310; 30; 20 INJECTION, SOLUTION INTRAVENOUS at 05:07

## 2022-10-05 RX ADMIN — ENOXAPARIN SODIUM 40 MG: 40 INJECTION SUBCUTANEOUS at 09:33

## 2022-10-05 ASSESSMENT — PAIN SCALES - GENERAL
PAINLEVEL_OUTOF10: 5
PAINLEVEL_OUTOF10: 3
PAINLEVEL_OUTOF10: 3
PAINLEVEL_OUTOF10: 4
PAINLEVEL_OUTOF10: 3

## 2022-10-05 ASSESSMENT — PAIN DESCRIPTION - FREQUENCY
FREQUENCY: INTERMITTENT

## 2022-10-05 ASSESSMENT — PAIN DESCRIPTION - DESCRIPTORS
DESCRIPTORS: CRAMPING;DISCOMFORT
DESCRIPTORS: DISCOMFORT

## 2022-10-05 ASSESSMENT — PAIN DESCRIPTION - PAIN TYPE
TYPE: ACUTE PAIN;SURGICAL PAIN

## 2022-10-05 ASSESSMENT — PAIN DESCRIPTION - LOCATION
LOCATION: ABDOMEN

## 2022-10-05 ASSESSMENT — PAIN - FUNCTIONAL ASSESSMENT
PAIN_FUNCTIONAL_ASSESSMENT: ACTIVITIES ARE NOT PREVENTED

## 2022-10-05 ASSESSMENT — PAIN DESCRIPTION - ONSET
ONSET: ON-GOING
ONSET: AWAKENED FROM SLEEP

## 2022-10-05 ASSESSMENT — PAIN DESCRIPTION - ORIENTATION
ORIENTATION: LOWER;MID

## 2022-10-05 NOTE — PROGRESS NOTES
Pt discharged at this time via wheel chair. Condition stable. Pt verbalizes understanding to keep appointment with OBGYN doctor. Discharged with her mother.

## 2022-10-05 NOTE — PLAN OF CARE
Problem: Discharge Planning  Goal: Discharge to home or other facility with appropriate resources  Outcome: Progressing     Problem: Chronic Conditions and Co-morbidities  Goal: Patient's chronic conditions and co-morbidity symptoms are monitored and maintained or improved  Outcome: Progressing     Problem: Pain  Goal: Verbalizes/displays adequate comfort level or baseline comfort level  Outcome: Progressing  Flowsheets (Taken 10/4/2022 0850 by Ephraim Izaguirre RN)  Verbalizes/displays adequate comfort level or baseline comfort level: Encourage patient to monitor pain and request assistance     Problem: ABCDS Injury Assessment  Goal: Absence of physical injury  Outcome: Progressing

## 2022-10-05 NOTE — PROGRESS NOTES
Pt awake and resting in bed. Informed pt if she needs nurse to call number listed on white board. Pt verbalizes understanding. Assessment completed. Then pt ambulates to the bathroom and cramer discontinued with catheter intact and pt tolerates without difficulty. Then pt ambulates in halls with nurse. Steady gait, denies feeling lightheaded or dizzy.

## 2022-10-05 NOTE — PLAN OF CARE
Problem: Discharge Planning  Goal: Discharge to home or other facility with appropriate resources  Outcome: Completed     Problem: Chronic Conditions and Co-morbidities  Goal: Patient's chronic conditions and co-morbidity symptoms are monitored and maintained or improved  Outcome: Completed     Problem: Pain  Goal: Verbalizes/displays adequate comfort level or baseline comfort level  Outcome: Completed  Flowsheets (Taken 10/5/2022 6852)  Verbalizes/displays adequate comfort level or baseline comfort level:   Encourage patient to monitor pain and request assistance   Assess pain using appropriate pain scale   Administer analgesics based on type and severity of pain and evaluate response   Implement non-pharmacological measures as appropriate and evaluate response   Consider cultural and social influences on pain and pain management   Notify Licensed Independent Practitioner if interventions unsuccessful or patient reports new pain     Problem: ABCDS Injury Assessment  Goal: Absence of physical injury  Outcome: Completed

## 2022-10-05 NOTE — PROGRESS NOTES
[facility] [address]   Progress Note  [facility] 0 1 2      Date: 10/5/2022   Patient: Jo Beth   : 1975   DOA: 10/4/2022   MRN: [id number]   ROOM#: [facility]/GO06-V     Admit Date: 10/4/2022     Collaborating Urologist at time of admission: Dr. Preston Soto  CC: Uterine prolapse  POD #1: Robotic-assisted laparoscopic hysterectomy, BSO, and sacrocolpopexy. Subjective:     Pain: mild, no nausea and no vomiting,   Bowel Movement/Flatus:  passing flatus  Voiding: Cleaning recently removed, awaiting void    Pt resting in bed, states she is feeling well overall and recently ambulated without difficulty. Objective:    Vitals:    /73   Pulse (!) 101   Temp 99.1 °F (37.3 °C) (Oral)   Resp 18   Ht 5' 7\" (1.702 m)   Wt 230 lb (104.3 kg)   LMP 2022 Comment: spotting  SpO2 95%   BMI 36.02 kg/m²    Temp  Av.7 °F (37.1 °C)  Min: 97.3 °F (36.3 °C)  Max: 99.2 °F (37.3 °C)     Intake/Output Summary (Last 24 hours) at 10/5/2022 3448  Last data filed at 10/5/2022 0505  Gross per 24 hour   Intake 5321.12 ml   Output 1800 ml   Net 3521.12 ml       Physical Exam:   General appearance: alert, appears stated age, cooperative, no distress, and mildly obese  Head: Normocephalic, without obvious abnormality, atraumatic  Back:  No CVA tenderness  Abdomen:  Soft, non-distended, mild lower abdominal TTP. Incision sites healing well with no signs of infection.     Labs:   WBC:    Lab Results   Component Value Date/Time    WBC 11.3 10/05/2022 05:57 AM      Hemoglobin/Hematocrit:    Lab Results   Component Value Date/Time    HGB 11.2 10/05/2022 05:57 AM    HCT 34.7 10/05/2022 05:57 AM      BMP:   Lab Results   Component Value Date/Time     2022 10:55 AM    K 4.0 2022 10:55 AM     2022 10:55 AM    CO2 23 2022 10:55 AM    BUN 18 2022 10:55 AM    LABALBU 4.7 2022 10:55 AM    LABALBU 4.7 2022 10:55 AM    CREATININE 0.8 2022 10:55 AM    CALCIUM 9.0 09/28/2022 10:55 AM    GFRAA >60 09/28/2022 10:55 AM    LABGLOM >60 09/28/2022 10:55 AM      PT/INR:    Lab Results   Component Value Date/Time    PROTIME 12.7 09/28/2022 10:55 AM    INR 0.98 09/28/2022 10:55 AM      PTT:    Lab Results   Component Value Date/Time    APTT 32.8 09/28/2022 10:55 AM     Imaging:   XR CHEST (2 VW)    Result Date: 9/30/2022  EXAMINATION: TWO XRAY VIEWS OF THE CHEST 9/28/2022 11:24 am COMPARISON: None. HISTORY: ORDERING SYSTEM PROVIDED HISTORY: Pre-op testing TECHNOLOGIST PROVIDED HISTORY: PA & Lateral Reason for exam:->pre-op testing Reason for Exam: pre op FINDINGS: The the lungs appear clear. The heart appears unremarkable. Osteophytes are noted in the thoracic spine. No active cardiopulmonary disease. Assessment & Plan:      Peter Cross is a 49 y.o. female admitted 10/4/2022 for uterine prolapse. 1) Uterine Prolapse: S/p robotic-assisted laparoscopic hysterectomy, BSO, and sacrocolpopexy 10/4/22   Pt tolerating diet and passing flatus. Cleaning recently removed, awaiting void. AF/VSS and labs stable   Post-op appointment scheduled with Dr. Tejal Araiza 10/18/22 at 3:10PM.    Pt stable from a  standpoint as long as she is able to void. Pt to follow up with Dr. Tejal Araiza 10/18/22. Patient seen and examined, chart reviewed.      Electronically signed by Makenzie Dang PA-C on 10/5/2022 at 9:09 AM

## 2022-10-05 NOTE — DISCHARGE INSTRUCTIONS
Care Plan Once You Return Home        This section includes instructions you will need to follow once you leave the hospital. Your care team will discuss this with you, so that you and those caring for you know how to best care for your health needs at home. This section may also include educational information about certain health topics that may be of help to you. Thank you for letting us care for you and your family. The following are discharge instructions for yourself. If you have any questions once you  Have arrived home please feel free to contact the [facility] at [phone].     Please follow discharge instructions given to you by Dr. Lm Jackson

## 2022-10-05 NOTE — PROGRESS NOTES
Discharge instructions given and reviewed with pt. Pt has printed out discharge instructions from Dr. Efrain Walker. Pt verbalizes understanding. Pt verbalizes understanding to keep appointment with GYN doctor when they call and make an appointment for her. Please see After Visit Summary Discharge Instructions. Pt denies any questions or concerns.

## 2022-10-05 NOTE — DISCHARGE SUMMARY
Admission date:10/4/22  Discharge date:10/5/22  Admission Diagnosis: Uterine prolapse with Cystocele, pelvic pain  Discharge Diagnosis: Same plus Uterine fibroids and right hydrosalpinx  Procedure(s) Performed: Robotic Laparoscopic Assisted Vaginal Hysterectomy with                                                  Bilateral Salpingectomy (and robotic SCP by Dr. Stacia Villa)  Hospital Course: No complications with procedure and normal post op care with release home with instructions and pain pill prescription given.   Condition on Discharge: Good

## 2022-10-09 LAB
ABO/RH: NORMAL
ANTIBODY SCREEN: NEGATIVE
COMMENT: NORMAL
COMPONENT: NORMAL
COMPONENT: NORMAL
CROSSMATCH RESULT: NORMAL
CROSSMATCH RESULT: NORMAL
STATUS: NORMAL
STATUS: NORMAL
TRANSFUSION STATUS: NORMAL
TRANSFUSION STATUS: NORMAL
UNIT DIVISION: 0
UNIT DIVISION: 0
UNIT NUMBER: NORMAL
UNIT NUMBER: NORMAL

## 2022-10-09 NOTE — ANESTHESIA POSTPROCEDURE EVALUATION
Department of Anesthesiology  Postprocedure Note    Patient: Ivette Harrison  MRN: [id number]  YOB: 1975  Date of evaluation: 10/9/2022      Procedure Summary     Date: 10/04/22 Room / Location: [address] [place] [address] OR [address] [place] [address]    Anesthesia Start: 1429 Anesthesia Stop: 1341    Procedures:       HYSTERECTOMY VAGINAL LAPAROSCOPIC ROBOTIC ASSISTED WITH POSS BSO (Abdomen/Perineum)      SACRAL COLPOPEXY ROBOTIC (Abdomen/Perineum)      TOT VAGINAL SLING (Abdomen/Perineum) Diagnosis:       Uterine prolapse      Female genital prolapse, unspecified type      SI (stress incontinence), female      (Uterine prolapse [N81.4])    Surgeons: Laith Timmons DO; Susi Cordero MD Responsible Provider: Jael Upton MD    Anesthesia Type: general ASA Status: 2          Anesthesia Type: No value filed.     Dain Phase I: Dain Score: 10    Dain Phase II:        Anesthesia Post Evaluation    Patient location during evaluation: PACU  Patient participation: complete - patient participated  Level of consciousness: awake and alert  Pain score: 0  Airway patency: patent  Nausea & Vomiting: no nausea and no vomiting  Complications: no  Cardiovascular status: blood pressure returned to baseline  Respiratory status: acceptable  Hydration status: euvolemic

## 2022-10-11 ENCOUNTER — TRANSCRIBE ORDERS (OUTPATIENT)
Dept: ADMINISTRATIVE | Age: 47
End: 2022-10-11

## 2022-10-11 DIAGNOSIS — N39.41 URGE INCONTINENCE: Primary | ICD-10-CM

## 2022-10-19 ENCOUNTER — HOSPITAL ENCOUNTER (OUTPATIENT)
Dept: ULTRASOUND IMAGING | Age: 47
Discharge: HOME | End: 2022-10-19
Payer: COMMERCIAL

## 2022-10-19 DIAGNOSIS — N39.41 URGE INCONTINENCE: ICD-10-CM

## 2022-10-19 PROCEDURE — 76775 US EXAM ABDO BACK WALL LIM: CPT

## 2025-06-03 ENCOUNTER — TRANSCRIBE ORDERS (OUTPATIENT)
Dept: ADMINISTRATIVE | Age: 50
End: 2025-06-03

## 2025-06-03 DIAGNOSIS — Z12.31 ENCOUNTER FOR SCREENING MAMMOGRAM FOR MALIGNANT NEOPLASM OF BREAST: Primary | ICD-10-CM

## 2025-06-03 DIAGNOSIS — N95.9 MENOPAUSAL PROBLEM: ICD-10-CM

## (undated) DEVICE — YANKAUER,FLEXIBLE HANDLE,REGLR CAPACITY: Brand: MEDLINE INDUSTRIES, INC.

## (undated) DEVICE — ALCOHOL RUBBING ISO 16OZ 70%

## (undated) DEVICE — SUTURE VCRL SZ 1 L27IN ABSRB VLT L26MM CT-2 1/2 CIR J335H

## (undated) DEVICE — SUTURE STRATAFIX SZ 3-0 L20CM ABSRB VLT NDL SH-1 L22MM 1/2 SXPP1B453

## (undated) DEVICE — NEEDLE HYPO 23GA L1.5IN TURQ POLYPR HUB S STL THN WALL IM

## (undated) DEVICE — APPLICATOR MEDICATED 26 CC SOLUTION HI LT ORNG CHLORAPREP

## (undated) DEVICE — SYRINGE 10ML HYPO W/O NDL W/ LUER SLP TP

## (undated) DEVICE — TOWEL,OR,DSP,ST,BLUE,STD,6/PK,12PK/CS: Brand: MEDLINE

## (undated) DEVICE — SUTURE VCRL SZ 2-0 L27IN ABSRB UD L26MM SH 1/2 CIR J417H

## (undated) DEVICE — GOWN,SIRUS,POLYRNF,BRTHSLV,XLN/XL,20/CS: Brand: MEDLINE

## (undated) DEVICE — INTENDED FOR TISSUE SEPARATION, AND OTHER PROCEDURES THAT REQUIRE A SHARP SURGICAL BLADE TO PUNCTURE OR CUT.: Brand: BARD-PARKER ® STAINLESS STEEL BLADES

## (undated) DEVICE — SYRINGE MED 50ML LUERLOCK TIP

## (undated) DEVICE — POSITIONER,HEAD,RING CUSHION,9IN,32CS: Brand: MEDLINE

## (undated) DEVICE — SUTURE VCRL SZ 2-0 L27IN ABSRB VLT L26MM SH 1/2 CIR J317H

## (undated) DEVICE — BLADELESS OBTURATOR: Brand: WECK VISTA

## (undated) DEVICE — SYRINGE MED 20ML STD CLR PLAS LUERLOCK TIP N CTRL DISP

## (undated) DEVICE — ADHESIVE SKIN CLSR 0.7ML TOP DERMBND ADV

## (undated) DEVICE — SUTURE GOR TX SZ 0 L36IN NONABSORBABLE L26MM THX-26 1/2 CIR 2N05A

## (undated) DEVICE — GLOVE SURG SZ 6 THK91MIL LTX FREE SYN POLYISOPRENE ANTI

## (undated) DEVICE — OPEN-END FLEXI-TIP URETERAL CATHETER: Brand: FLEXI-TIP

## (undated) DEVICE — SUTURE VCRL SZ 4-0 L18IN ABSRB UD L16MM PS-4 1/2 CIR PRIM J507G

## (undated) DEVICE — ZIMMER® STERILE DISPOSABLE TOURNIQUET CUFF WITH PLC, DUAL PORT, SINGLE BLADDER, 18 IN. (46 CM)

## (undated) DEVICE — RETRACTOR SURG W18.3XL32.5CM PLAS SELF RET W/ 2 CATH CLP

## (undated) DEVICE — SOLUTION IRRIG 1000ML 09% SOD CHL USP PIC PLAS CONTAINER

## (undated) DEVICE — 100% SILICONE FOLEY CATHETER,5-10 ML, 2-WAY: Brand: DOVER

## (undated) DEVICE — COUNTER NDL 30 COUNT FOAM STRP SGL MAG

## (undated) DEVICE — TOTAL TRAY, DB, 100% SILI FOLEY, 16FR 10: Brand: MEDLINE

## (undated) DEVICE — POSITIONER HD AD W4.5XH8XL9IN HIGHLY RESILIENT FOAM CMFRT

## (undated) DEVICE — MARKER SURG SKIN UTIL REGULAR/FINE 2 TIP W/ RUL AND 9 LBL

## (undated) DEVICE — SYSTEM ES CUP DIA3.5CM PNEUMO OCCL BLLN DISP FOR CLIN POS

## (undated) DEVICE — PADDING,UNDERCAST,COTTON, 3X4YD STERILE: Brand: MEDLINE

## (undated) DEVICE — PAD MATERNITY CURITY ADH STRIP DISP

## (undated) DEVICE — SUTURE V-LOC 180 SZ 3-0 L12IN ABSRB GRN V-20 L26MM 1/2 CIR VLOCL0614

## (undated) DEVICE — SPONGE GZ W4XL8IN COT WVN 12 PLY

## (undated) DEVICE — DRESSING,GAUZE,XEROFORM,CURAD,1"X8",ST: Brand: CURAD

## (undated) DEVICE — SURGICAL PROCEDURE PACK LITH ROBOTIC

## (undated) DEVICE — BANDAGE COMPR W2INXL5YD WHT BGE POLY COT M E WRP WV HK AND

## (undated) DEVICE — DRAPE,U/ SHT,SPLIT,PLAS,STERIL: Brand: MEDLINE

## (undated) DEVICE — CORD,CAUTERY,BIPOLAR,STERILE: Brand: MEDLINE

## (undated) DEVICE — SYSTEM ES CUP DIA4CM PNEUMO OCCL BLLN DISP FOR CLIN POS

## (undated) DEVICE — TUBING FLTR PLUME AWAY EVAC W/ SUCT DEV DISP PUREVIEW

## (undated) DEVICE — GLOVE ORANGE PI 7 1/2   MSG9075

## (undated) DEVICE — TIP IU L10CM DIA6.7MM GRN SIL FLX DISP RUMI II

## (undated) DEVICE — SUTURE VCRL SZ 0 L27IN ABSRB UD L26MM CT-2 1/2 CIR J270H

## (undated) DEVICE — TIP COVER ACCESSORY

## (undated) DEVICE — SYSTEM ES CUP DIA3CM PNEUMO OCCL BLLN DISP FOR CLIN POS

## (undated) DEVICE — SUTURE VCRL SZ 3-0 L27IN ABSRB VLT L26MM SH 1/2 CIR J316H

## (undated) DEVICE — PORT HND ACC L120MM 12MM BLDELSS OPT TIP AIRSEAL

## (undated) DEVICE — SUTURE NONABSORBABLE MONOFILAMENT 4-0 FS-2 18 IN ETHILON 662H

## (undated) DEVICE — TRAY PREP DRY W/ PREM GLV 2 APPL 6 SPNG 2 UNDPD 1 OVERWRAP

## (undated) DEVICE — BANDAGE,ELASTIC,ESMARK,STERILE,4"X9',LF: Brand: MEDLINE

## (undated) DEVICE — GAUZE,SPONGE,4"X4",16PLY,XRAY,STRL,LF: Brand: MEDLINE